# Patient Record
Sex: FEMALE | Race: WHITE | NOT HISPANIC OR LATINO | Employment: OTHER | URBAN - METROPOLITAN AREA
[De-identification: names, ages, dates, MRNs, and addresses within clinical notes are randomized per-mention and may not be internally consistent; named-entity substitution may affect disease eponyms.]

---

## 2017-01-04 ENCOUNTER — OFFICE VISIT (OUTPATIENT)
Dept: CARDIOLOGY | Facility: CLINIC | Age: 82
End: 2017-01-04

## 2017-01-04 VITALS
HEIGHT: 60 IN | SYSTOLIC BLOOD PRESSURE: 134 MMHG | WEIGHT: 122 LBS | HEART RATE: 80 BPM | BODY MASS INDEX: 23.95 KG/M2 | DIASTOLIC BLOOD PRESSURE: 70 MMHG

## 2017-01-04 DIAGNOSIS — I25.10 CHRONIC CORONARY ARTERY DISEASE: ICD-10-CM

## 2017-01-04 DIAGNOSIS — R06.02 SHORTNESS OF BREATH: ICD-10-CM

## 2017-01-04 DIAGNOSIS — R06.02 SOB (SHORTNESS OF BREATH): Primary | ICD-10-CM

## 2017-01-04 DIAGNOSIS — R00.1 BRADYCARDIA: Primary | ICD-10-CM

## 2017-01-04 DIAGNOSIS — R00.1 SINUS BRADYCARDIA: ICD-10-CM

## 2017-01-04 DIAGNOSIS — R00.2 PALPITATIONS: ICD-10-CM

## 2017-01-04 DIAGNOSIS — E78.5 HYPERLIPIDEMIA, UNSPECIFIED HYPERLIPIDEMIA TYPE: ICD-10-CM

## 2017-01-04 DIAGNOSIS — I10 ESSENTIAL HYPERTENSION: ICD-10-CM

## 2017-01-04 PROCEDURE — 99213 OFFICE O/P EST LOW 20 MIN: CPT | Performed by: INTERNAL MEDICINE

## 2017-01-04 RX ORDER — ACETAMINOPHEN 500 MG
500 TABLET ORAL EVERY 6 HOURS PRN
Status: ON HOLD | COMMUNITY
End: 2017-02-16 | Stop reason: ALTCHOICE

## 2017-01-04 NOTE — MR AVS SNAPSHOT
Trudy Castano   1/4/2017 9:00 AM   Office Visit    Dept Phone:  [phone]   Encounter #:  [id number]    Provider:  Phoebe Bowles MD   Department:  [facility]                Your Full Care Plan              Your Updated Medication List          This list is accurate as of: 1/4/17 10:08 AM.  Always use your most recent med list.                acetaminophen 500 MG tablet   Commonly known as:  TYLENOL       aspirin 81 MG tablet       furosemide 40 MG tablet   Commonly known as:  LASIX       montelukast 10 MG tablet   Commonly known as:  SINGULAIR   TAKE 1 TABLET DAILY       nebivolol 2.5 MG tablet   Commonly known as:  BYSTOLIC       potassium chloride 20 MEQ CR tablet   Commonly known as:  K-DUR,KLOR-CON       pravastatin 40 MG tablet   Commonly known as:  PRAVACHOL   TAKE 1 TABLET ONCE DAILY       traZODone 50 MG tablet   Commonly known as:  DESYREL       vitamin D3 5000 UNITS capsule capsule               You Were Diagnosed With        Codes Comments    Bradycardia    -  Primary ICD-10-CM: R00.1  ICD-9-CM: 427.89     Shortness of breath     ICD-10-CM: R06.02  ICD-9-CM: 786.05       Instructions     None    Patient Instructions History      Upcoming Appointments     Visit Type Date Time Department    OFFICE VISIT 1/4/2017  9:00 AM [facility] [place]     [facility] ECHO 2D COMPLETE VT 2/2/2017  9:00 AM [facility] [place] [facility]    FOLLOW UP 2/8/2017  9:15 AM [facility] [place]      MyChart Signup     Our records indicate that you have declined [facility]. If you would like to sign up for [place], please email [email or web address] or call [phone] to obtain an activation code.             Other Info from Your Visit           Your Appointments     Feb 02, 2017  9:00 AM EST   ECHOCARDIOGRAM 2D COMPLETE VISIT with [facility]   [facility] [place] [address] ([place] [address]  "[facility] [address] [phone]           Bring your insurance cards with you. Wear comfortable clothing and shoes.            Feb 08, 2017  9:15 AM EST   Follow Up with Phoebe Bowles MD   [facility] (--)    [address] [facility] [address] [phone]           Arrive 15 minutes prior to appointment.              Allergies     No Known Allergies      Vital Signs     Blood Pressure Pulse Height Weight Body Mass Index Smoking Status    134/70 80 60\" (152.4 cm) 122 lb (55.3 kg) 23.83 kg/m2 Former Smoker      Problems and Diagnoses Noted     Bradycardia    Shortness of breath        "

## 2017-01-04 NOTE — PROGRESS NOTES
Subjective:       Trudy Castano is a 84 y.o. female who here for follow up    CC  Follow-up for the hypertension coronary artery disease and pericardial  HPI  84-year-old white female with a known history of coronary artery disease as well as a benign essential hypertension, complaining of the shortness of breath and fatigue but no chest pains or tightness in chest no heaviness with a pressure sensation     Problem List Items Addressed This Visit        Cardiovascular and Mediastinum    Hypertension    Chronic coronary artery disease    Bradycardia - Primary       Respiratory    Shortness of breath       Other    Hyperlipidemia        Previous treatments/evaluations include: ASA and beta blocker. Cardiac risk factors: advanced age (older than 55 for men, 65 for women) and hypertension.    The following portions of the patient's history were reviewed and updated as appropriate: allergies, current medications, past family history, past medical history, past social history, past surgical history and problem list.    Past Medical History   Diagnosis Date   • Arrhythmia    • Benign essential hypertension    • Broken bones    • Cancer      skin cancer   • Chronic diastolic CHF (congestive heart failure)    • COPD (chronic obstructive pulmonary disease)    • Depression    • Fatigue    • Glaucoma    • Headache    • Hyperlipidemia    • Osteoporosis    • Palpitations    • Pneumonia    • Premature atrial complex    • Premature ventricular contractions    • Shortness of breath    • Sinus bradycardia    • Vasovagal syncope    • Weak pulse     reports that she quit smoking about 49 years ago. She has never used smokeless tobacco. She reports that she does not drink alcohol or use illicit drugs.  Family History   Problem Relation Age of Onset   • Heart disease Mother        Review of Systems  Constitutional: No wt loss, fever, fatigue  Gastrointestinal: No nausea, abdominal pain  Behavioral/Psych: No insomnia or anxiety    "Cardiovascular no chest pains or tightness in chest complaints of shortness of breath and fatigue  Objective:       Physical Exam           Physical Exam  Visit Vitals   • /70   • Pulse 80   • Ht 60\" (152.4 cm)   • Wt 122 lb (55.3 kg)   • BMI 23.83 kg/m2     General appearance: alert, appears stated age and cooperative, oriented x 3  Neck: no JVD and supple, symmetrical, trachea midline  Lungs: clear to auscultation bilaterally  Heart:Normal PMI,  S1, S2 normal, no murmur, rub or gallop  Extremities: normal range of motion, no cyanosis or edema,  Pulses: 2+ and symmetric  Skin: Skin color, texture, turgor normal. No rashes or lesions  Psych:  Pleasant and cooperative        Cardiographics  @Procedures    Echocardiogram:        Current Outpatient Prescriptions:   •  acetaminophen (TYLENOL) 500 MG tablet, Take 500 mg by mouth Every 6 (Six) Hours As Needed for mild pain (1-3)., Disp: , Rfl:   •  aspirin 81 MG tablet, Take 81 mg by mouth daily., Disp: , Rfl:   •  Cholecalciferol (VITAMIN D3) 5000 UNITS capsule capsule, Take by mouth., Disp: , Rfl:   •  furosemide (LASIX) 40 MG tablet, Take 40 mg by mouth., Disp: , Rfl:   •  montelukast (SINGULAIR) 10 MG tablet, TAKE 1 TABLET DAILY, Disp: 90 tablet, Rfl: 1  •  potassium chloride (K-DUR,KLOR-CON) 20 MEQ CR tablet, Take 20 mEq by mouth., Disp: , Rfl:   •  pravastatin (PRAVACHOL) 40 MG tablet, TAKE 1 TABLET ONCE DAILY, Disp: 90 tablet, Rfl: 1  •  traZODone (DESYREL) 50 MG tablet, Take 0.5 tablets by mouth nightly., Disp: , Rfl:   •  nebivolol (BYSTOLIC) 2.5 MG tablet, Take 2.5 mg by mouth daily., Disp: , Rfl:    Assessment:        Patient Active Problem List   Diagnosis   • Echocardiogram abnormal   • Abnormal electrocardiogram   • Depression   • Diabetes mellitus   • Fatigue   • Glaucoma   • Hyperlipidemia   • Hypertension   • Osteoporosis   • Palpitations   • Premature atrial contraction   • Ventricular premature beats   • Sinus bradycardia   • Shortness of breath "   • Vasovagal syncope   • Weak pulse   • Communicating hydrocephalus   • Intermittent asthma   • Oropharyngeal dysphagia   • Precordial pain   • Abnormal cardiovascular stress test   • Chronic coronary artery disease   • Supraventricular tachycardia     IMPRESSION OF HEART CATHETERIZATION:    1. Normal left main coronary artery.  2. Normal left anterior descending artery and its branches with the LAD   50% stenosis in mid portion  3. Normal left circumflex artery and its branches.  4. Normal right coronary artery dominant  5. Normal left ventricular systolic function.  LVEDP 10 mmHg.  Ejection   fraction 45%.mild mitral regurgitation    RECOMMENDATION:  Considering the coronary artery disease moderate, mild LV   dysfunction and mild mitral regurgitation is to be treated medically.      ·  The left ventricular cavity is borderline dilated.  · Left ventricular function is mildly decreased.  · Left ventricular diastolic dysfunction (grade I) consistent with impaired relaxation.  · Left ventricular wall segments contract abnormally. Refer to wall scoring diagram for more information.  · Mild mitral valve regurgitation is present  · Mild tricuspid valve regurgitation is present.  There is a trivial pericardial effusion   Plan:            ICD-10-CM ICD-9-CM   1. Bradycardia R00.1 427.89   2. Shortness of breath R06.02 786.05       Pt still sob    Will reduce the lasix to half and kcl to half    See in 1 month with echo when and lasix and kcl will be stopped    Restart bystolic    Risk of the hyperlipidemia, importance of the treatment has been explained    Pros and cons of the statins has been explained    Regular blood workup as well as side effects including the liver failure, myelopathy death has been explained          COUNSELING:    Trudy Trinh was given to patient for the following topics: diagnostic results, risk factor reductions, impressions, risks and benefits of treatment options and importance of  treatment compliance .       SMOKING COUNSELING:    Counseling given: Not Answered      EMR Dragon/Transcription disclaimer:   Much of this encounter note is an electronic transcription/translation of spoken language to printed text. The electronic translation of spoken language may permit erroneous, or at times, nonsensical words or phrases to be inadvertently transcribed; Although I have reviewed the note for such errors, some may still exist.

## 2017-02-02 ENCOUNTER — HOSPITAL ENCOUNTER (OUTPATIENT)
Dept: CARDIOLOGY | Facility: HOSPITAL | Age: 82
Discharge: HOME | End: 2017-02-02
Attending: INTERNAL MEDICINE | Admitting: INTERNAL MEDICINE

## 2017-02-02 VITALS
BODY MASS INDEX: 23.95 KG/M2 | WEIGHT: 122 LBS | DIASTOLIC BLOOD PRESSURE: 57 MMHG | SYSTOLIC BLOOD PRESSURE: 148 MMHG | HEART RATE: 40 BPM | HEIGHT: 60 IN

## 2017-02-02 DIAGNOSIS — R00.1 SINUS BRADYCARDIA: ICD-10-CM

## 2017-02-02 DIAGNOSIS — R00.2 PALPITATIONS: ICD-10-CM

## 2017-02-02 DIAGNOSIS — R06.02 SOB (SHORTNESS OF BREATH): ICD-10-CM

## 2017-02-02 PROCEDURE — 93306 TTE W/DOPPLER COMPLETE: CPT

## 2017-02-02 PROCEDURE — 93306 TTE W/DOPPLER COMPLETE: CPT | Performed by: INTERNAL MEDICINE

## 2017-02-03 LAB
BH CV ECHO MEAS - ACS: 1.4 CM
BH CV ECHO MEAS - AO MAX PG (FULL): 2.9 MMHG
BH CV ECHO MEAS - AO MAX PG: 6.5 MMHG
BH CV ECHO MEAS - AO MEAN PG (FULL): 2 MMHG
BH CV ECHO MEAS - AO MEAN PG: 4 MMHG
BH CV ECHO MEAS - AO ROOT AREA (BSA CORRECTED): 1.8
BH CV ECHO MEAS - AO ROOT AREA: 5.7 CM^2
BH CV ECHO MEAS - AO ROOT DIAM: 2.7 CM
BH CV ECHO MEAS - AO V2 MAX: 127 CM/SEC
BH CV ECHO MEAS - AO V2 MEAN: 91.7 CM/SEC
BH CV ECHO MEAS - AO V2 VTI: 30.7 CM
BH CV ECHO MEAS - AVA(I,A): 1.7 CM^2
BH CV ECHO MEAS - AVA(I,D): 1.7 CM^2
BH CV ECHO MEAS - AVA(V,A): 1.7 CM^2
BH CV ECHO MEAS - AVA(V,D): 1.7 CM^2
BH CV ECHO MEAS - BSA(HAYCOCK): 1.5 M^2
BH CV ECHO MEAS - BSA: 1.5 M^2
BH CV ECHO MEAS - BZI_BMI: 23.8 KILOGRAMS/M^2
BH CV ECHO MEAS - BZI_DIASTOLIC_PRESSURE: 57 MMHG
BH CV ECHO MEAS - BZI_HEART_RATE: 40 BPM
BH CV ECHO MEAS - BZI_METRIC_HEIGHT: 152.4 CM
BH CV ECHO MEAS - BZI_METRIC_WEIGHT: 55.3 KG
BH CV ECHO MEAS - BZI_SYSTOLIC_PRESSURE: 148 MMHG
BH CV ECHO MEAS - CONTRAST EF 4CH: 55.6 ML/M^2
BH CV ECHO MEAS - EDV(CUBED): 59.3 ML
BH CV ECHO MEAS - EDV(MOD-SP4): 36 ML
BH CV ECHO MEAS - EDV(TEICH): 65.9 ML
BH CV ECHO MEAS - EF(CUBED): 63.4 %
BH CV ECHO MEAS - EF(MOD-SP4): 55.6 %
BH CV ECHO MEAS - EF(TEICH): 55.6 %
BH CV ECHO MEAS - ESV(CUBED): 21.7 ML
BH CV ECHO MEAS - ESV(MOD-SP4): 16 ML
BH CV ECHO MEAS - ESV(TEICH): 29.3 ML
BH CV ECHO MEAS - FS: 28.5 %
BH CV ECHO MEAS - IVS/LVPW: 1
BH CV ECHO MEAS - IVSD: 1.1 CM
BH CV ECHO MEAS - LA DIMENSION: 3 CM
BH CV ECHO MEAS - LA/AO: 1.1
BH CV ECHO MEAS - LAT PEAK E' VEL: 7 CM/SEC
BH CV ECHO MEAS - LV DIASTOLIC VOL/BSA (35-75): 23.8 ML/M^2
BH CV ECHO MEAS - LV MASS(C)D: 140.1 GRAMS
BH CV ECHO MEAS - LV MASS(C)DI: 92.6 GRAMS/M^2
BH CV ECHO MEAS - LV MAX PG: 3.6 MMHG
BH CV ECHO MEAS - LV MEAN PG: 2 MMHG
BH CV ECHO MEAS - LV SYSTOLIC VOL/BSA (12-30): 10.6 ML/M^2
BH CV ECHO MEAS - LV V1 MAX: 94.7 CM/SEC
BH CV ECHO MEAS - LV V1 MEAN: 69.1 CM/SEC
BH CV ECHO MEAS - LV V1 VTI: 23 CM
BH CV ECHO MEAS - LVIDD: 3.9 CM
BH CV ECHO MEAS - LVIDS: 2.8 CM
BH CV ECHO MEAS - LVLD AP4: 6.5 CM
BH CV ECHO MEAS - LVLS AP4: 6.2 CM
BH CV ECHO MEAS - LVOT AREA (M): 2.3 CM^2
BH CV ECHO MEAS - LVOT AREA: 2.3 CM^2
BH CV ECHO MEAS - LVOT DIAM: 1.7 CM
BH CV ECHO MEAS - LVPWD: 1.1 CM
BH CV ECHO MEAS - MED PEAK E' VEL: 3 CM/SEC
BH CV ECHO MEAS - MR MAX PG: 28.5 MMHG
BH CV ECHO MEAS - MR MAX VEL: 267 CM/SEC
BH CV ECHO MEAS - MV A DUR: 0.12 SEC
BH CV ECHO MEAS - MV A MAX VEL: 91.8 CM/SEC
BH CV ECHO MEAS - MV DEC SLOPE: 312 CM/SEC^2
BH CV ECHO MEAS - MV DEC TIME: 0.2 SEC
BH CV ECHO MEAS - MV E MAX VEL: 57.8 CM/SEC
BH CV ECHO MEAS - MV E/A: 0.63
BH CV ECHO MEAS - MV MAX PG: 3.3 MMHG
BH CV ECHO MEAS - MV MEAN PG: 1 MMHG
BH CV ECHO MEAS - MV P1/2T MAX VEL: 61.2 CM/SEC
BH CV ECHO MEAS - MV P1/2T: 57.5 MSEC
BH CV ECHO MEAS - MV V2 MAX: 90.2 CM/SEC
BH CV ECHO MEAS - MV V2 MEAN: 27.5 CM/SEC
BH CV ECHO MEAS - MV V2 VTI: 19.2 CM
BH CV ECHO MEAS - MVA P1/2T LCG: 3.6 CM^2
BH CV ECHO MEAS - MVA(P1/2T): 3.8 CM^2
BH CV ECHO MEAS - MVA(VTI): 2.7 CM^2
BH CV ECHO MEAS - PA MAX PG: 2.7 MMHG
BH CV ECHO MEAS - PA MEAN PG: 1 MMHG
BH CV ECHO MEAS - PA V2 MAX: 78.2 CM/SEC
BH CV ECHO MEAS - PA V2 MEAN: 42.9 CM/SEC
BH CV ECHO MEAS - PA V2 VTI: 14.7 CM
BH CV ECHO MEAS - PULM A REVS DUR: 0.11 SEC
BH CV ECHO MEAS - PULM A REVS VEL: 51.3 CM/SEC
BH CV ECHO MEAS - PULM DIAS VEL: 42.9 CM/SEC
BH CV ECHO MEAS - PULM S/D: 1.3
BH CV ECHO MEAS - PULM SYS VEL: 57.3 CM/SEC
BH CV ECHO MEAS - RAP SYSTOLE: 10 MMHG
BH CV ECHO MEAS - RVOT AREA: 2 CM^2
BH CV ECHO MEAS - RVOT DIAM: 1.6 CM
BH CV ECHO MEAS - RVSP: 33.6 MMHG
BH CV ECHO MEAS - SI(AO): 116.2 ML/M^2
BH CV ECHO MEAS - SI(CUBED): 24.9 ML/M^2
BH CV ECHO MEAS - SI(LVOT): 34.5 ML/M^2
BH CV ECHO MEAS - SI(MOD-SP4): 13.2 ML/M^2
BH CV ECHO MEAS - SI(TEICH): 24.2 ML/M^2
BH CV ECHO MEAS - SV(AO): 175.8 ML
BH CV ECHO MEAS - SV(CUBED): 37.6 ML
BH CV ECHO MEAS - SV(LVOT): 52.2 ML
BH CV ECHO MEAS - SV(MOD-SP4): 20 ML
BH CV ECHO MEAS - SV(TEICH): 36.6 ML
BH CV ECHO MEAS - TAPSE (>1.6): 1.3 CM2
BH CV ECHO MEAS - TR MAX VEL: 243 CM/SEC
BH CV XLRA - RV BASE: 2.1 CM
BH CV XLRA - RV LENGTH: 6 CM
BH CV XLRA - RV MID: 1.8 CM
E/E' RATIO: 13
LEFT ATRIUM VOLUME INDEX: 17 ML/M2

## 2017-02-06 RX ORDER — NEBIVOLOL 2.5 MG/1
2.5 TABLET ORAL DAILY
Qty: 30 TABLET | Refills: 5 | Status: SHIPPED | OUTPATIENT
Start: 2017-02-06 | End: 2017-02-08 | Stop reason: SDUPTHER

## 2017-02-08 ENCOUNTER — OFFICE VISIT (OUTPATIENT)
Dept: CARDIOLOGY | Facility: CLINIC | Age: 82
End: 2017-02-08

## 2017-02-08 VITALS
HEART RATE: 40 BPM | SYSTOLIC BLOOD PRESSURE: 127 MMHG | WEIGHT: 121 LBS | BODY MASS INDEX: 23.75 KG/M2 | HEIGHT: 60 IN | DIASTOLIC BLOOD PRESSURE: 57 MMHG

## 2017-02-08 DIAGNOSIS — I47.1 SUPRAVENTRICULAR TACHYCARDIA (HCC): ICD-10-CM

## 2017-02-08 DIAGNOSIS — I25.10 CHRONIC CORONARY ARTERY DISEASE: Primary | ICD-10-CM

## 2017-02-08 DIAGNOSIS — I49.1 PREMATURE ATRIAL CONTRACTION: ICD-10-CM

## 2017-02-08 DIAGNOSIS — R09.89 WEAK PULSE: ICD-10-CM

## 2017-02-08 DIAGNOSIS — R00.2 PALPITATIONS: ICD-10-CM

## 2017-02-08 DIAGNOSIS — R55 VASOVAGAL SYNCOPE: ICD-10-CM

## 2017-02-08 DIAGNOSIS — E78.49 OTHER HYPERLIPIDEMIA: ICD-10-CM

## 2017-02-08 DIAGNOSIS — R00.1 BRADYCARDIA: ICD-10-CM

## 2017-02-08 DIAGNOSIS — R07.89 OTHER CHEST PAIN: ICD-10-CM

## 2017-02-08 DIAGNOSIS — E78.5 HYPERLIPIDEMIA, UNSPECIFIED HYPERLIPIDEMIA TYPE: ICD-10-CM

## 2017-02-08 DIAGNOSIS — Z01.810 PRE-OPERATIVE CARDIOVASCULAR EXAMINATION: Primary | ICD-10-CM

## 2017-02-08 DIAGNOSIS — R00.1 SINUS BRADYCARDIA: Primary | ICD-10-CM

## 2017-02-08 DIAGNOSIS — I10 ESSENTIAL HYPERTENSION: ICD-10-CM

## 2017-02-08 DIAGNOSIS — R00.1 SINUS BRADYCARDIA: ICD-10-CM

## 2017-02-08 PROCEDURE — 99213 OFFICE O/P EST LOW 20 MIN: CPT | Performed by: INTERNAL MEDICINE

## 2017-02-08 RX ORDER — NEBIVOLOL 2.5 MG/1
2.5 TABLET ORAL DAILY
Qty: 45 TABLET | Refills: 2 | Status: SHIPPED | OUTPATIENT
Start: 2017-02-08 | End: 2017-02-15

## 2017-02-08 RX ORDER — MELATONIN
1000 DAILY
COMMUNITY
End: 2020-12-21 | Stop reason: SDUPTHER

## 2017-02-08 NOTE — PROGRESS NOTES
Subjective:       Trudy Castano is a 84 y.o. female who here for follow up    CC  Increasing weakness and fatigue  HPI  84-year-old white female with known history of the chronic coronary artery disease, essential arterial hypertension and hyperlipidemia, has a significant episodes of bradycardia, patient has been feeling weak and tired and exhausted on minimum exertion which has been gradually worsening, pacemaker has been discussed for the several years, so far has been deferred because of the no clear indication, with a heart rate now is in the low 40s     Problem List Items Addressed This Visit        Cardiovascular and Mediastinum    Hyperlipidemia    Hypertension    Chronic coronary artery disease - Primary    Supraventricular tachycardia    Bradycardia        Previous treatments/evaluations include: ASA. Cardiac risk factors: advanced age (older than 55 for men, 65 for women).    The following portions of the patient's history were reviewed and updated as appropriate: allergies, current medications, past family history, past medical history, past social history, past surgical history and problem list.    Past Medical History   Diagnosis Date   • Arrhythmia    • Benign essential hypertension    • Broken bones    • Cancer      skin cancer   • Chronic diastolic CHF (congestive heart failure)    • COPD (chronic obstructive pulmonary disease)    • Depression    • Fatigue    • Glaucoma    • Headache    • Hyperlipidemia    • Osteoporosis    • Palpitations    • Pneumonia    • Premature atrial complex    • Premature ventricular contractions    • Shortness of breath    • Sinus bradycardia    • Vasovagal syncope    • Weak pulse     reports that she quit smoking about 49 years ago. She has never used smokeless tobacco. She reports that she does not drink alcohol or use illicit drugs.  Family History   Problem Relation Age of Onset   • Heart disease Mother        Review of Systems  Constitutional: No wt loss, fever,  "fatigue  Gastrointestinal: No nausea, abdominal pain  Behavioral/Psych: No insomnia or anxiety   Cardiovascular weakness and tiredness  Objective:       Physical Exam             Physical Exam  Visit Vitals   • /57 (BP Location: Left arm)   • Pulse (!) 40   • Ht 60\" (152.4 cm)   • Wt 121 lb (54.9 kg)   • Breastfeeding No   • BMI 23.63 kg/m2       General appearance: NAD, conversant   Eyes: anicteric sclerae, moist conjunctivae; no lid-lag; PERRLA   HENT: Atraumatic; oropharynx clear with moist mucous membranes and no mucosal ulcerations;  normal hard and soft palate   Neck: Trachea midline; FROM, supple, no thyromegaly or lymphadenopathy   Lungs: CTA, with normal respiratory effort and no intercostal retractions   CV: S1-S2 regular, sys murmurs, no rub, no gallop   Abdomen: Soft, non-tender; no masses or HSM   Extremities: No peripheral edema or extremity lymphadenopathy  Skin: Normal temperature, turgor and texture; no rash, ulcers or subcutaneous nodules   Psych: Appropriate affect, alert and oriented to person, place and time           Cardiographics  @Procedures    Echocardiogram:        Current Outpatient Prescriptions:   •  acetaminophen (TYLENOL) 500 MG tablet, Take 500 mg by mouth Every 6 (Six) Hours As Needed for mild pain (1-3)., Disp: , Rfl:   •  aspirin 81 MG tablet, Take 81 mg by mouth daily., Disp: , Rfl:   •  cholecalciferol (VITAMIN D3) 1000 UNITS tablet, Take 1,000 Units by mouth Daily., Disp: , Rfl:   •  furosemide (LASIX) 40 MG tablet, Take 40 mg by mouth Daily. TAKES 1/2 TABLET DAILY, Disp: , Rfl:   •  montelukast (SINGULAIR) 10 MG tablet, TAKE 1 TABLET DAILY, Disp: 90 tablet, Rfl: 1  •  nebivolol (BYSTOLIC) 2.5 MG tablet, Take 1 tablet by mouth Daily., Disp: 45 tablet, Rfl: 2  •  potassium chloride (K-DUR,KLOR-CON) 20 MEQ CR tablet, Take 20 mEq by mouth. TAKE 1/2 TABLET DAILY, Disp: , Rfl:   •  pravastatin (PRAVACHOL) 40 MG tablet, TAKE 1 TABLET ONCE DAILY, Disp: 90 tablet, Rfl: 1   " Assessment:        Patient Active Problem List   Diagnosis   • Echocardiogram abnormal   • Abnormal electrocardiogram   • Depression   • Diabetes mellitus   • Fatigue   • Glaucoma   • Hyperlipidemia   • Hypertension   • Osteoporosis   • Palpitations   • Premature atrial contraction   • Ventricular premature beats   • Sinus bradycardia   • Shortness of breath   • Vasovagal syncope   • Weak pulse   • Communicating hydrocephalus   • Intermittent asthma   • Oropharyngeal dysphagia   • Precordial pain   • Abnormal cardiovascular stress test   • Chronic coronary artery disease   • Supraventricular tachycardia   • Bradycardia     · All left ventricular wall segments contract normally.  · Left Ventricle: Calculated EF = 55.6%  · There is no evidence of pericardial effusion  · Trace-to-mild mitral valve regurgitation          Plan:            ICD-10-CM ICD-9-CM   1. Chronic coronary artery disease I25.10 414.00   2. Supraventricular tachycardia I47.1 427.89   3. Bradycardia R00.1 427.89   4. Other hyperlipidemia E78.4 272.4   5. Essential hypertension I10 401.9     84-year-old white female has a severe sick sinus syndrome highly symptomatic, gradually getting worse will require permanent pacemaker implantation    COUNSELING:  PT WILL BE TAKEN OFF THE LASIX AND KCL  NEEDS PACER    84-year-old white female having significant episodes of weakness as well as a fatigue which has been gradually getting worse, this has been going on for approximately 3 years, we have tried to avoid the pacemaker but things are worsening and patient has highly symptomatic sick sinus syndrome and will proceed with a pacemaker implantation, procedure risk and options have been explained    Trudy Trinh was given to patient for the following topics: diagnostic results, risk factor reductions, impressions, risks and benefits of treatment options and importance of treatment compliance .       SMOKING COUNSELING:    Counseling given: Not  Answered      EMR Dragon/Transcription disclaimer:   Much of this encounter note is an electronic transcription/translation of spoken language to printed text. The electronic translation of spoken language may permit erroneous, or at times, nonsensical words or phrases to be inadvertently transcribed; Although I have reviewed the note for such errors, some may still exist.

## 2017-02-13 ENCOUNTER — HOSPITAL ENCOUNTER (OUTPATIENT)
Dept: CARDIOLOGY | Facility: HOSPITAL | Age: 82
Discharge: HOME | End: 2017-02-13
Admitting: INTERNAL MEDICINE

## 2017-02-13 DIAGNOSIS — E78.5 HYPERLIPIDEMIA, UNSPECIFIED HYPERLIPIDEMIA TYPE: ICD-10-CM

## 2017-02-13 DIAGNOSIS — R07.89 OTHER CHEST PAIN: ICD-10-CM

## 2017-02-13 DIAGNOSIS — Z01.810 PRE-OPERATIVE CARDIOVASCULAR EXAMINATION: ICD-10-CM

## 2017-02-13 DIAGNOSIS — R00.1 SINUS BRADYCARDIA: ICD-10-CM

## 2017-02-13 LAB
ANION GAP SERPL CALCULATED.3IONS-SCNC: 15.6 MMOL/L
APTT PPP: 28.9 SECONDS (ref 22.7–35.4)
BASOPHILS # BLD AUTO: 0.06 10*3/MM3 (ref 0–0.2)
BASOPHILS NFR BLD AUTO: 1.3 % (ref 0–1.5)
BUN BLD-MCNC: 17 MG/DL (ref 8–23)
BUN/CREAT SERPL: 19.5 (ref 7–25)
CALCIUM SPEC-SCNC: 9.6 MG/DL (ref 8.6–10.5)
CHLORIDE SERPL-SCNC: 103 MMOL/L (ref 98–107)
CO2 SERPL-SCNC: 23.4 MMOL/L (ref 22–29)
CREAT BLD-MCNC: 0.87 MG/DL (ref 0.57–1)
DEPRECATED RDW RBC AUTO: 44.6 FL (ref 37–54)
EOSINOPHIL # BLD AUTO: 0.21 10*3/MM3 (ref 0–0.7)
EOSINOPHIL NFR BLD AUTO: 4.4 % (ref 0.3–6.2)
ERYTHROCYTE [DISTWIDTH] IN BLOOD BY AUTOMATED COUNT: 13 % (ref 11.7–13)
GFR SERPL CREATININE-BSD FRML MDRD: 62 ML/MIN/1.73
GLUCOSE BLD-MCNC: 149 MG/DL (ref 65–99)
HCT VFR BLD AUTO: 38.8 % (ref 35.6–45.5)
HGB BLD-MCNC: 12.8 G/DL (ref 11.9–15.5)
IMM GRANULOCYTES # BLD: 0 10*3/MM3 (ref 0–0.03)
IMM GRANULOCYTES NFR BLD: 0 % (ref 0–0.5)
INR PPP: 1.02 (ref 0.9–1.1)
LYMPHOCYTES # BLD AUTO: 1.16 10*3/MM3 (ref 0.9–4.8)
LYMPHOCYTES NFR BLD AUTO: 24.4 % (ref 19.6–45.3)
MCH RBC QN AUTO: 30.8 PG (ref 26.9–32)
MCHC RBC AUTO-ENTMCNC: 33 G/DL (ref 32.4–36.3)
MCV RBC AUTO: 93.3 FL (ref 80.5–98.2)
MONOCYTES # BLD AUTO: 0.38 10*3/MM3 (ref 0.2–1.2)
MONOCYTES NFR BLD AUTO: 8 % (ref 5–12)
NEUTROPHILS # BLD AUTO: 2.94 10*3/MM3 (ref 1.9–8.1)
NEUTROPHILS NFR BLD AUTO: 61.9 % (ref 42.7–76)
PLATELET # BLD AUTO: 243 10*3/MM3 (ref 140–500)
PMV BLD AUTO: 10.7 FL (ref 6–12)
POTASSIUM BLD-SCNC: 4.6 MMOL/L (ref 3.5–5.2)
PROTHROMBIN TIME: 13 SECONDS (ref 11.7–14.2)
RBC # BLD AUTO: 4.16 10*6/MM3 (ref 3.9–5.2)
SODIUM BLD-SCNC: 142 MMOL/L (ref 136–145)
WBC NRBC COR # BLD: 4.75 10*3/MM3 (ref 4.5–10.7)

## 2017-02-13 PROCEDURE — 85610 PROTHROMBIN TIME: CPT | Performed by: INTERNAL MEDICINE

## 2017-02-13 PROCEDURE — 85025 COMPLETE CBC W/AUTO DIFF WBC: CPT | Performed by: INTERNAL MEDICINE

## 2017-02-13 PROCEDURE — 85730 THROMBOPLASTIN TIME PARTIAL: CPT | Performed by: INTERNAL MEDICINE

## 2017-02-13 PROCEDURE — 36415 COLL VENOUS BLD VENIPUNCTURE: CPT | Performed by: INTERNAL MEDICINE

## 2017-02-13 PROCEDURE — 80048 BASIC METABOLIC PNL TOTAL CA: CPT | Performed by: INTERNAL MEDICINE

## 2017-02-15 RX ORDER — MONTELUKAST SODIUM 10 MG/1
10 TABLET ORAL NIGHTLY
COMMUNITY
End: 2020-12-21

## 2017-02-15 RX ORDER — NEBIVOLOL 2.5 MG/1
2.5 TABLET ORAL DAILY
COMMUNITY
End: 2017-07-25 | Stop reason: SDUPTHER

## 2017-02-15 RX ORDER — PRAVASTATIN SODIUM 40 MG
40 TABLET ORAL DAILY
COMMUNITY
End: 2019-03-13

## 2017-02-16 ENCOUNTER — HOSPITAL ENCOUNTER (OUTPATIENT)
Facility: HOSPITAL | Age: 82
Discharge: HOME | End: 2017-02-17
Attending: INTERNAL MEDICINE | Admitting: INTERNAL MEDICINE

## 2017-02-16 DIAGNOSIS — R55 VASOVAGAL SYNCOPE: ICD-10-CM

## 2017-02-16 DIAGNOSIS — I49.1 PREMATURE ATRIAL CONTRACTION: ICD-10-CM

## 2017-02-16 DIAGNOSIS — R00.1 BRADYCARDIA: ICD-10-CM

## 2017-02-16 DIAGNOSIS — R00.1 SINUS BRADYCARDIA: ICD-10-CM

## 2017-02-16 DIAGNOSIS — R00.2 PALPITATIONS: ICD-10-CM

## 2017-02-16 DIAGNOSIS — R09.89 WEAK PULSE: ICD-10-CM

## 2017-02-16 LAB
ANION GAP SERPL CALCULATED.3IONS-SCNC: 12.5 MMOL/L
BUN BLD-MCNC: 14 MG/DL (ref 8–23)
BUN/CREAT SERPL: 18.9 (ref 7–25)
CALCIUM SPEC-SCNC: 9.3 MG/DL (ref 8.6–10.5)
CHLORIDE SERPL-SCNC: 101 MMOL/L (ref 98–107)
CO2 SERPL-SCNC: 25.5 MMOL/L (ref 22–29)
CREAT BLD-MCNC: 0.74 MG/DL (ref 0.57–1)
DEPRECATED RDW RBC AUTO: 44.2 FL (ref 37–54)
ERYTHROCYTE [DISTWIDTH] IN BLOOD BY AUTOMATED COUNT: 12.8 % (ref 11.7–13)
GFR SERPL CREATININE-BSD FRML MDRD: 75 ML/MIN/1.73
GLUCOSE BLD-MCNC: 159 MG/DL (ref 65–99)
HCT VFR BLD AUTO: 38.5 % (ref 35.6–45.5)
HGB BLD-MCNC: 12.7 G/DL (ref 11.9–15.5)
MCH RBC QN AUTO: 31.3 PG (ref 26.9–32)
MCHC RBC AUTO-ENTMCNC: 33 G/DL (ref 32.4–36.3)
MCV RBC AUTO: 94.8 FL (ref 80.5–98.2)
PLATELET # BLD AUTO: 222 10*3/MM3 (ref 140–500)
PMV BLD AUTO: 10.4 FL (ref 6–12)
POTASSIUM BLD-SCNC: 3.8 MMOL/L (ref 3.5–5.2)
RBC # BLD AUTO: 4.06 10*6/MM3 (ref 3.9–5.2)
SODIUM BLD-SCNC: 139 MMOL/L (ref 136–145)
WBC NRBC COR # BLD: 10.97 10*3/MM3 (ref 4.5–10.7)

## 2017-02-16 PROCEDURE — C1898 LEAD, PMKR, OTHER THAN TRANS: HCPCS | Performed by: INTERNAL MEDICINE

## 2017-02-16 PROCEDURE — 85027 COMPLETE CBC AUTOMATED: CPT | Performed by: INTERNAL MEDICINE

## 2017-02-16 PROCEDURE — 0 IOPAMIDOL PER 1 ML: Performed by: INTERNAL MEDICINE

## 2017-02-16 PROCEDURE — 25010000002 VANCOMYCIN PER 500 MG: Performed by: INTERNAL MEDICINE

## 2017-02-16 PROCEDURE — 99153 MOD SED SAME PHYS/QHP EA: CPT | Performed by: INTERNAL MEDICINE

## 2017-02-16 PROCEDURE — G0378 HOSPITAL OBSERVATION PER HR: HCPCS

## 2017-02-16 PROCEDURE — 33208 INSRT HEART PM ATRIAL & VENT: CPT | Performed by: INTERNAL MEDICINE

## 2017-02-16 PROCEDURE — 25010000002 MIDAZOLAM PER 1 MG: Performed by: INTERNAL MEDICINE

## 2017-02-16 PROCEDURE — 99152 MOD SED SAME PHYS/QHP 5/>YRS: CPT | Performed by: INTERNAL MEDICINE

## 2017-02-16 PROCEDURE — C1785 PMKR, DUAL, RATE-RESP: HCPCS | Performed by: INTERNAL MEDICINE

## 2017-02-16 PROCEDURE — C1894 INTRO/SHEATH, NON-LASER: HCPCS | Performed by: INTERNAL MEDICINE

## 2017-02-16 PROCEDURE — 25010000003 CEFAZOLIN PER 500 MG: Performed by: INTERNAL MEDICINE

## 2017-02-16 PROCEDURE — 25010000002 ONDANSETRON PER 1 MG: Performed by: NURSE PRACTITIONER

## 2017-02-16 PROCEDURE — 80048 BASIC METABOLIC PNL TOTAL CA: CPT | Performed by: INTERNAL MEDICINE

## 2017-02-16 PROCEDURE — 25010000002 FENTANYL CITRATE (PF) 100 MCG/2ML SOLUTION: Performed by: INTERNAL MEDICINE

## 2017-02-16 DEVICE — PACE/SENSE LEAD
Type: IMPLANTABLE DEVICE | Status: FUNCTIONAL
Brand: INGEVITY™ MRI

## 2017-02-16 DEVICE — PACEMAKER
Type: IMPLANTABLE DEVICE | Status: FUNCTIONAL
Brand: ACCOLADE™ MRI DR

## 2017-02-16 DEVICE — ENDOCARDIAL STEROID-ELUTING MR CONDITIONAL STYLET DELIVERED PACE/SENSE LEAD
Type: IMPLANTABLE DEVICE | Status: FUNCTIONAL
Brand: INGEVITY™ MRI

## 2017-02-16 RX ORDER — LIDOCAINE HYDROCHLORIDE 10 MG/ML
0.1 INJECTION, SOLUTION EPIDURAL; INFILTRATION; INTRACAUDAL; PERINEURAL ONCE AS NEEDED
Status: DISCONTINUED | OUTPATIENT
Start: 2017-02-16 | End: 2017-02-17 | Stop reason: HOSPADM

## 2017-02-16 RX ORDER — HYDROCODONE BITARTRATE AND ACETAMINOPHEN 5; 325 MG/1; MG/1
1 TABLET ORAL EVERY 4 HOURS PRN
Status: DISCONTINUED | OUTPATIENT
Start: 2017-02-16 | End: 2017-02-17 | Stop reason: HOSPADM

## 2017-02-16 RX ORDER — ASPIRIN 325 MG
162 TABLET ORAL DAILY
Status: DISCONTINUED | OUTPATIENT
Start: 2017-02-16 | End: 2017-02-17 | Stop reason: HOSPADM

## 2017-02-16 RX ORDER — NEBIVOLOL 5 MG/1
2.5 TABLET ORAL DAILY
Status: DISCONTINUED | OUTPATIENT
Start: 2017-02-16 | End: 2017-02-17 | Stop reason: HOSPADM

## 2017-02-16 RX ORDER — SODIUM CHLORIDE 0.9 % (FLUSH) 0.9 %
1-10 SYRINGE (ML) INJECTION AS NEEDED
Status: DISCONTINUED | OUTPATIENT
Start: 2017-02-16 | End: 2017-02-17 | Stop reason: HOSPADM

## 2017-02-16 RX ORDER — FENTANYL CITRATE 50 UG/ML
INJECTION, SOLUTION INTRAMUSCULAR; INTRAVENOUS AS NEEDED
Status: DISCONTINUED | OUTPATIENT
Start: 2017-02-16 | End: 2017-02-16 | Stop reason: HOSPADM

## 2017-02-16 RX ORDER — MONTELUKAST SODIUM 10 MG/1
10 TABLET ORAL NIGHTLY
Status: DISCONTINUED | OUTPATIENT
Start: 2017-02-16 | End: 2017-02-17 | Stop reason: HOSPADM

## 2017-02-16 RX ORDER — PRAVASTATIN SODIUM 40 MG
40 TABLET ORAL DAILY
Status: DISCONTINUED | OUTPATIENT
Start: 2017-02-16 | End: 2017-02-17 | Stop reason: HOSPADM

## 2017-02-16 RX ORDER — ONDANSETRON 2 MG/ML
4 INJECTION INTRAMUSCULAR; INTRAVENOUS ONCE
Status: COMPLETED | OUTPATIENT
Start: 2017-02-16 | End: 2017-02-16

## 2017-02-16 RX ORDER — FUROSEMIDE 40 MG/1
40 TABLET ORAL DAILY
Status: DISCONTINUED | OUTPATIENT
Start: 2017-02-16 | End: 2017-02-17 | Stop reason: HOSPADM

## 2017-02-16 RX ORDER — VANCOMYCIN HYDROCHLORIDE 1 G/200ML
INJECTION, SOLUTION INTRAVENOUS CONTINUOUS PRN
Status: DISCONTINUED | OUTPATIENT
Start: 2017-02-16 | End: 2017-02-16 | Stop reason: HOSPADM

## 2017-02-16 RX ORDER — ACETAMINOPHEN 325 MG/1
650 TABLET ORAL EVERY 4 HOURS PRN
Status: DISCONTINUED | OUTPATIENT
Start: 2017-02-16 | End: 2017-02-17 | Stop reason: HOSPADM

## 2017-02-16 RX ORDER — MIDAZOLAM HYDROCHLORIDE 1 MG/ML
INJECTION INTRAMUSCULAR; INTRAVENOUS AS NEEDED
Status: DISCONTINUED | OUTPATIENT
Start: 2017-02-16 | End: 2017-02-16 | Stop reason: HOSPADM

## 2017-02-16 RX ORDER — MELATONIN
1000 DAILY
Status: DISCONTINUED | OUTPATIENT
Start: 2017-02-16 | End: 2017-02-17 | Stop reason: HOSPADM

## 2017-02-16 RX ORDER — POTASSIUM CHLORIDE 750 MG/1
10 CAPSULE, EXTENDED RELEASE ORAL DAILY
Status: DISCONTINUED | OUTPATIENT
Start: 2017-02-16 | End: 2017-02-17 | Stop reason: HOSPADM

## 2017-02-16 RX ORDER — NITROGLYCERIN 0.4 MG/1
0.4 TABLET SUBLINGUAL
Status: DISCONTINUED | OUTPATIENT
Start: 2017-02-16 | End: 2017-02-17 | Stop reason: HOSPADM

## 2017-02-16 RX ORDER — LIDOCAINE HYDROCHLORIDE 10 MG/ML
INJECTION, SOLUTION INFILTRATION; PERINEURAL AS NEEDED
Status: DISCONTINUED | OUTPATIENT
Start: 2017-02-16 | End: 2017-02-16 | Stop reason: HOSPADM

## 2017-02-16 RX ORDER — SODIUM CHLORIDE 9 MG/ML
125 INJECTION, SOLUTION INTRAVENOUS CONTINUOUS
Status: DISCONTINUED | OUTPATIENT
Start: 2017-02-16 | End: 2017-02-17 | Stop reason: HOSPADM

## 2017-02-16 RX ADMIN — CEFAZOLIN SODIUM 1 G: 1 INJECTION, SOLUTION INTRAVENOUS at 07:43

## 2017-02-16 RX ADMIN — VANCOMYCIN HYDROCHLORIDE 750 MG: 750 INJECTION, POWDER, LYOPHILIZED, FOR SOLUTION INTRAVENOUS at 20:42

## 2017-02-16 RX ADMIN — HYDROCODONE BITARTRATE AND ACETAMINOPHEN 1 TABLET: 5; 325 TABLET ORAL at 22:20

## 2017-02-16 RX ADMIN — ONDANSETRON 4 MG: 2 INJECTION INTRAMUSCULAR; INTRAVENOUS at 13:53

## 2017-02-16 RX ADMIN — HYDROCODONE BITARTRATE AND ACETAMINOPHEN 1 TABLET: 5; 325 TABLET ORAL at 12:11

## 2017-02-16 NOTE — PLAN OF CARE
Problem: Patient Care Overview (Adult)  Goal: Plan of Care Review  Outcome: Ongoing (interventions implemented as appropriate)    02/16/17 1140   Coping/Psychosocial Response Interventions   Plan Of Care Reviewed With patient;velasquez   Patient Care Overview   Progress no change   Outcome Evaluation   Outcome Summary/Follow up Plan ready for transfer       Goal: Adult Individualization and Mutuality  Outcome: Ongoing (interventions implemented as appropriate)  Goal: Discharge Needs Assessment  Outcome: Ongoing (interventions implemented as appropriate)    Problem: Perioperative Period (Adult)  Goal: Signs and Symptoms of Listed Potential Problems Will be Absent or Manageable (Perioperative Period)  Outcome: Ongoing (interventions implemented as appropriate)

## 2017-02-17 ENCOUNTER — APPOINTMENT (OUTPATIENT)
Dept: GENERAL RADIOLOGY | Facility: HOSPITAL | Age: 82
End: 2017-02-17
Attending: INTERNAL MEDICINE

## 2017-02-17 VITALS
BODY MASS INDEX: 23.56 KG/M2 | HEIGHT: 60 IN | HEART RATE: 96 BPM | WEIGHT: 120 LBS | SYSTOLIC BLOOD PRESSURE: 134 MMHG | DIASTOLIC BLOOD PRESSURE: 56 MMHG | TEMPERATURE: 97.7 F | OXYGEN SATURATION: 96 % | RESPIRATION RATE: 16 BRPM

## 2017-02-17 LAB
INR PPP: 1.07 (ref 0.9–1.1)
PROTHROMBIN TIME: 13.5 SECONDS (ref 11.7–14.2)

## 2017-02-17 PROCEDURE — 71020 HC CHEST PA AND LATERAL: CPT

## 2017-02-17 PROCEDURE — 93005 ELECTROCARDIOGRAM TRACING: CPT | Performed by: INTERNAL MEDICINE

## 2017-02-17 PROCEDURE — 93010 ELECTROCARDIOGRAM REPORT: CPT | Performed by: INTERNAL MEDICINE

## 2017-02-17 PROCEDURE — G0378 HOSPITAL OBSERVATION PER HR: HCPCS

## 2017-02-17 PROCEDURE — 85610 PROTHROMBIN TIME: CPT | Performed by: INTERNAL MEDICINE

## 2017-02-17 PROCEDURE — 99217 PR OBSERVATION CARE DISCHARGE MANAGEMENT: CPT | Performed by: INTERNAL MEDICINE

## 2017-02-17 NOTE — DISCHARGE SUMMARY
[facility]  Physician Discharge Summary    Patient Identification:  Name: Trudy Castano  Age: 84 y.o.  Sex: female  :  10/12/1932  MRN: [id number]    Admit date: 2017    Discharge date and time:  17      Admitting Physician: Phoebe Bowles MD     Discharge Physician: DR Bowles    Discharge Diagnoses:   - sss/ symptomatic bradycardia  - s/p a-v Ace maker implant  - CAD  - HTN  - Dyslipidemia      Discharged Condition: Stable    Hospital Course:   This patient was admitted for elective permanent pacemaker implant due to symptomatic bradycardia with weakness, fatigability with minimal exertion and bradycardia and documented heart rates into the 40s.  She received IV antibiotic prophylaxis before implant of [employer] dual-chamber permanent pacemaker utilizing the left subclavian.  She tolerated the procedure without complications.    On postprocedure day #1, device interrogation showed normal function.  Chest x-ray (see below) shows no acute findings.  EKG (see below) show sinus rhythm with PACs.  Intermittent atrial pacing noted on telemetry.    Patient is awake alert and ambulating without difficulty.  She has minimal incisional discomfort.  I reviewed activity restrictions, site care, follow-up appointments with the patient.  An appointment card was provided.  She was instructed to call our office with any questions or concerns otherwise we will see her as scheduled on .  All questions were answered.  She voiced understanding and agreement with treatment plan      Consults:   None    Discharge Exam:  General: No acute distress   Skin: Warm and dry, no diaphoresis noted   EYES: PERTL   HEENT: external ear and nose normal; oral mucosa moist   Neck: no JVD   Heart: S1S2 fairly regular rate and rhythm without occasional premature beat; no murmur or rub appreciated left subclavian procedure site with well approximated edges, dry intact Steri-Strips.  No oozing,  ecchymosis, hematoma    Pulmonary: Respirations regular, unlabored at rest, bilateral breath sounds have good air entry throughout all lung fields; no crackles or wheezes auscultated.     GI: Soft, non-tender, positive bowel sounds     Extremities: Bilateral lower extremities have no pre-tibial pitting edema   Neurological: Alert and oriented x 3; no neuro deficits      Telemetry: Sinus with PACs, occasional AV pacing        Postop chest x-ray 2-17-17 FINDINGS:  1. Interval placement of pacemaker 5 left subclavian approach, no pneumothorax.  2. Mild chronic pulmonary change, previous ACDF with instrumentation lower cervical spine, vascular calcification borderline cardiac enlargement question emphysema without evidence of acute pulmonary parenchymal process.    Postop EKG 2-17-17:      LABS:    Results from last 7 days  Lab Units 02/17/17  0454 02/13/17  1502   INR  1.07 1.02   APTT seconds  --  28.9         Disposition:  home    Discharge Medications:    Trudy Castano   Home Medication Instructions ALLIE:[id number]    Printed on:02/17/17 8814   Medication Information                      aspirin 81 MG tablet  Take 81 mg by mouth daily.             cholecalciferol (VITAMIN D3) 1000 UNITS tablet  Take 1,000 Units by mouth Daily.             furosemide (LASIX) 40 MG tablet  Take 40 mg by mouth Daily. TAKES 1/2 TABLET DAILY             montelukast (SINGULAIR) 10 MG tablet  Take 10 mg by mouth Every Night.             nebivolol (BYSTOLIC) 2.5 MG tablet  Take 2.5 mg by mouth Daily.             potassium chloride (K-DUR,KLOR-CON) 20 MEQ CR tablet  Take 10 mEq by mouth Daily. TAKE 1/2 TABLET DAILY             pravastatin (PRAVACHOL) 40 MG tablet  Take 40 mg by mouth Daily.                   Discharge Home Instructions:  1. Routine post pacemaker implant discharge home care instructions:  Do not get site wet for 72 hours.  Do not submerge device site below water for 7-10 days.  No lifting left upper extremity above  head.  No lifting objects greater than 1 pound with affected extremity.   No driving until cleared by cardiologist at follow up appointment.   Wear sling until cleared by cardiologist.  Call office at [phone] or [phone] for any fevers, chills, redness,bleeding, drainage, increased pain, etc.     2. Follow up in the pacemaker clinic at the [address] office as scheduled on March 9 at 3:15  3.  Follow-up with PCP 7-10 days        I reviewed the patient's new clinical results, discharge treatments, medications and follow up  I personally viewed and interpreted the patient's EKG/Telemetry data  Signed:  Phoebe Bowles MD  2/17/2017  10:35 AM      EMR Dragon/Transcription disclaimer:   Much of this encounter note is an electronic transcription/translation of spoken language to printed text. The electronic translation of spoken language may permit erroneous, or at times, nonsensical words or phrases to be inadvertently transcribed; Although I have reviewed the note for such errors, some may still exist.

## 2017-02-17 NOTE — PROGRESS NOTES
Discharge Planning Assessment  [facility]     Patient Name: Trudy Castano  MRN: [id number]  Today's Date: 2/17/2017    Admit Date: 2/16/2017          Discharge Needs Assessment       02/17/17 1033    Living Environment    Lives With alone    Living Arrangements house    Quality Of Family Relationships supportive    Able to Return to Prior Living Arrangements yes    Discharge Needs Assessment    Concerns To Be Addressed no discharge needs identified    Readmission Within The Last 30 Days no previous admission in last 30 days    Equipment Currently Used at Home none    Transportation Available car;family or friend will provide            Discharge Plan       02/17/17 1037    Case Management/Social Work Plan    Additional Comments Spoke with patient at bedside, face sheet verified. Patient lives alone in a 2 story house with a basement her laundry room is in the basement, she stated she has hand rails to her basement. Patient does not use any DME. Patient denies any discharge needs stating she has 5 children that help her. José CHEN      02/17/17 1033    Case Management/Social Work Plan    Plan home        Discharge Placement     No information found        Expected Discharge Date and Time     Expected Discharge Date Expected Discharge Time    Feb 17, 2017               Demographic Summary       02/17/17 1030    Referral Information    Admission Type observation    Contact Information    Permission Granted to Share Information With family/designee   Remigio Castano son [phone]    Primary Care Physician Information    Name Ramón Begum MD    Phone [phone]            Functional Status       02/17/17 1032    Functional Status Current    Ambulation 0-->independent    Transferring 0-->independent    Toileting 2-->assistive person    Bathing 2-->assistive person    Dressing 2-->assistive person    Eating 0-->independent    Communication 0-->understands/communicates without difficulty    Swallowing (if  score 2 or more for any item, consult Rehab Services) 0-->swallows foods/liquids without difficulty    Functional Status Prior    Ambulation 0-->independent    Transferring 0-->independent    Toileting 0-->independent    Bathing 0-->independent    Dressing 0-->independent    Eating 0-->independent    Communication 0-->understands/communicates without difficulty    Swallowing 0-->swallows foods/liquids without difficulty    IADL    Medications independent    Meal Preparation independent    Housekeeping independent    Laundry independent    Shopping independent    Oral Care independent                   Shannon Brunson, RN

## 2017-03-06 RX ORDER — MONTELUKAST SODIUM 10 MG/1
TABLET ORAL
Qty: 90 TABLET | Refills: 1 | OUTPATIENT
Start: 2017-03-06

## 2017-03-09 ENCOUNTER — OFFICE VISIT (OUTPATIENT)
Dept: CARDIOLOGY | Facility: CLINIC | Age: 82
End: 2017-03-09

## 2017-03-09 ENCOUNTER — CLINICAL SUPPORT NO REQUIREMENTS (OUTPATIENT)
Dept: CARDIOLOGY | Facility: CLINIC | Age: 82
End: 2017-03-09

## 2017-03-09 VITALS
HEART RATE: 60 BPM | DIASTOLIC BLOOD PRESSURE: 58 MMHG | WEIGHT: 122 LBS | SYSTOLIC BLOOD PRESSURE: 118 MMHG | BODY MASS INDEX: 23.95 KG/M2 | HEIGHT: 60 IN

## 2017-03-09 DIAGNOSIS — I25.10 CHRONIC CORONARY ARTERY DISEASE: Primary | ICD-10-CM

## 2017-03-09 DIAGNOSIS — I25.10 CORONARY ARTERY DISEASE INVOLVING NATIVE CORONARY ARTERY OF NATIVE HEART WITHOUT ANGINA PECTORIS: ICD-10-CM

## 2017-03-09 DIAGNOSIS — Z95.0 PACEMAKER: ICD-10-CM

## 2017-03-09 DIAGNOSIS — Z95.0 PACEMAKER: Primary | ICD-10-CM

## 2017-03-09 DIAGNOSIS — I10 ESSENTIAL HYPERTENSION: ICD-10-CM

## 2017-03-09 DIAGNOSIS — R53.83 OTHER FATIGUE: ICD-10-CM

## 2017-03-09 PROCEDURE — 93288 INTERROG EVL PM/LDLS PM IP: CPT | Performed by: INTERNAL MEDICINE

## 2017-03-09 PROCEDURE — 99213 OFFICE O/P EST LOW 20 MIN: CPT | Performed by: INTERNAL MEDICINE

## 2017-03-09 NOTE — PROGRESS NOTES
Subjective:       Trudy Castano is a 84 y.o. female who here for follow up    CC  Follow up for HTN, FATIGUE AND PACEMAKER IMPLANTATION  HPI  84 YR OLD WITH KN CAD, SSS, HAS MARKEDLY IMPROVED SINCE PACEMAKER PLACEMENT DONE LAST MONTH     Problem List Items Addressed This Visit        Cardiovascular and Mediastinum    Chronic coronary artery disease - Primary      Other Visit Diagnoses     Coronary artery disease involving native coronary artery of native heart without angina pectoris            Previous treatments/evaluations include: ASA and beta blocker. Cardiac risk factors: advanced age (older than 55 for men, 65 for women) and hypertension.    The following portions of the patient's history were reviewed and updated as appropriate: allergies, current medications, past family history, past medical history, past social history, past surgical history and problem list.    Past Medical History   Diagnosis Date   • Arrhythmia    • Benign essential hypertension    • Broken bones    • Cancer      skin cancer   • Chronic diastolic CHF (congestive heart failure)    • COPD (chronic obstructive pulmonary disease)    • Depression    • Fatigue    • Glaucoma    • Headache    • Hyperlipidemia    • Osteoporosis    • Palpitations    • Pneumonia    • Premature atrial complex    • Premature ventricular contractions    • Shortness of breath    • Sinus bradycardia    • Vasovagal syncope    • Weak pulse     reports that she quit smoking about 50 years ago. She has never used smokeless tobacco. She reports that she does not drink alcohol or use illicit drugs.  Family History   Problem Relation Age of Onset   • Heart disease Mother        Review of Systems  Constitutional: No wt loss, fever, fatigue  Gastrointestinal: No nausea, abdominal pain  Behavioral/Psych: No insomnia or anxiety   Cardiovascular NO CP  Objective:       Physical Exam             Physical Exam  Visit Vitals   • /58 (BP Location: Right arm, Patient  "Position: Sitting)   • Pulse 60   • Ht 60\" (152.4 cm)   • Wt 122 lb (55.3 kg)   • BMI 23.83 kg/m2       General appearance: NAD, conversant   Eyes: anicteric sclerae, moist conjunctivae; no lid-lag; PERRLA   HENT: Atraumatic; oropharynx clear with moist mucous membranes and no mucosal ulcerations;  normal hard and soft palate   Neck: Trachea midline; FROM, supple, no thyromegaly or lymphadenopathy   Lungs: CTA, with normal respiratory effort and no intercostal retractions   CV: S1-S2 regular, no murmurs, no rub, no gallop   Abdomen: Soft, non-tender; no masses or HSM   Extremities: No peripheral edema or extremity lymphadenopathy  Skin: Normal temperature, turgor and texture; no rash, ulcers or subcutaneous nodules   Psych: Appropriate affect, alert and oriented to person, place and time     PACEMAKER HEALED WELL      Cardiographics  @Procedures    Echocardiogram:        Current Outpatient Prescriptions:   •  aspirin 81 MG tablet, Take 81 mg by mouth daily., Disp: , Rfl:   •  cholecalciferol (VITAMIN D3) 1000 UNITS tablet, Take 1,000 Units by mouth Daily., Disp: , Rfl:   •  furosemide (LASIX) 40 MG tablet, Take 40 mg by mouth Daily. TAKES 1/2 TABLET DAILY, Disp: , Rfl:   •  montelukast (SINGULAIR) 10 MG tablet, Take 10 mg by mouth Every Night., Disp: , Rfl:   •  nebivolol (BYSTOLIC) 2.5 MG tablet, Take 2.5 mg by mouth Daily., Disp: , Rfl:   •  potassium chloride (K-DUR,KLOR-CON) 20 MEQ CR tablet, Take 10 mEq by mouth Daily. TAKE 1/2 TABLET DAILY, Disp: , Rfl:   •  pravastatin (PRAVACHOL) 40 MG tablet, Take 40 mg by mouth Daily., Disp: , Rfl:    Assessment:        Patient Active Problem List   Diagnosis   • Echocardiogram abnormal   • Abnormal electrocardiogram   • Depression   • Diabetes mellitus   • Fatigue   • Glaucoma   • Hyperlipidemia   • Hypertension   • Osteoporosis   • Palpitations   • Premature atrial contraction   • Ventricular premature beats   • Sinus bradycardia   • Shortness of breath   • Vasovagal " syncope   • Weak pulse   • Communicating hydrocephalus   • Intermittent asthma   • Oropharyngeal dysphagia   • Precordial pain   • Abnormal cardiovascular stress test   • Chronic coronary artery disease   • Supraventricular tachycardia   • Bradycardia               Plan:            ICD-10-CM ICD-9-CM   1. Chronic coronary artery disease I25.10 414.00   2. Coronary artery disease involving native coronary artery of native heart without angina pectoris I25.10 414.01   3. Essential hypertension I10 401.9   4. Other fatigue R53.83 780.79   5. Pacemaker Z95.0 V45.01       Post pacer healing well    htn much better    See 6 months    COUNSELING:    Trudy Trinh was given to patient for the following topics: diagnostic results, risk factor reductions, impressions, risks and benefits of treatment options and importance of treatment compliance .       SMOKING COUNSELING:    Counseling given: Not Answered      EMR Dragon/Transcription disclaimer:   Much of this encounter note is an electronic transcription/translation of spoken language to printed text. The electronic translation of spoken language may permit erroneous, or at times, nonsensical words or phrases to be inadvertently transcribed; Although I have reviewed the note for such errors, some may still exist.

## 2017-03-13 PROBLEM — I25.10 CORONARY ARTERY DISEASE INVOLVING NATIVE CORONARY ARTERY OF NATIVE HEART WITHOUT ANGINA PECTORIS: Status: ACTIVE | Noted: 2017-03-13

## 2017-03-13 PROBLEM — Z95.0 PACEMAKER: Status: ACTIVE | Noted: 2017-03-13

## 2017-06-15 ENCOUNTER — CLINICAL SUPPORT NO REQUIREMENTS (OUTPATIENT)
Dept: CARDIOLOGY | Facility: CLINIC | Age: 82
End: 2017-06-15

## 2017-06-15 DIAGNOSIS — Z45.018 ADJUSTMENT AND MANAGEMENT OF CARDIAC PACEMAKER: Primary | ICD-10-CM

## 2017-06-15 PROCEDURE — 93280 PM DEVICE PROGR EVAL DUAL: CPT | Performed by: INTERNAL MEDICINE

## 2017-07-25 RX ORDER — NEBIVOLOL 2.5 MG/1
2.5 TABLET ORAL DAILY
Qty: 90 TABLET | Refills: 0 | Status: SHIPPED | OUTPATIENT
Start: 2017-07-25 | End: 2017-10-30 | Stop reason: SDUPTHER

## 2017-09-28 ENCOUNTER — CLINICAL SUPPORT NO REQUIREMENTS (OUTPATIENT)
Dept: CARDIOLOGY | Facility: CLINIC | Age: 82
End: 2017-09-28

## 2017-09-28 ENCOUNTER — OFFICE VISIT (OUTPATIENT)
Dept: CARDIOLOGY | Facility: CLINIC | Age: 82
End: 2017-09-28

## 2017-09-28 VITALS
DIASTOLIC BLOOD PRESSURE: 61 MMHG | SYSTOLIC BLOOD PRESSURE: 125 MMHG | WEIGHT: 121 LBS | BODY MASS INDEX: 23.75 KG/M2 | HEIGHT: 60 IN | HEART RATE: 77 BPM

## 2017-09-28 DIAGNOSIS — I25.10 CORONARY ARTERY DISEASE INVOLVING NATIVE CORONARY ARTERY OF NATIVE HEART WITHOUT ANGINA PECTORIS: ICD-10-CM

## 2017-09-28 DIAGNOSIS — Z95.0 CARDIAC PACEMAKER: Primary | ICD-10-CM

## 2017-09-28 DIAGNOSIS — I47.1 SUPRAVENTRICULAR TACHYCARDIA (HCC): ICD-10-CM

## 2017-09-28 DIAGNOSIS — I25.10 CHRONIC CORONARY ARTERY DISEASE: ICD-10-CM

## 2017-09-28 DIAGNOSIS — R06.02 SOB (SHORTNESS OF BREATH): Primary | ICD-10-CM

## 2017-09-28 DIAGNOSIS — R00.1 BRADYCARDIA: ICD-10-CM

## 2017-09-28 DIAGNOSIS — Z95.0 PACEMAKER: ICD-10-CM

## 2017-09-28 PROCEDURE — 93288 INTERROG EVL PM/LDLS PM IP: CPT | Performed by: INTERNAL MEDICINE

## 2017-09-28 PROCEDURE — 99213 OFFICE O/P EST LOW 20 MIN: CPT | Performed by: INTERNAL MEDICINE

## 2017-09-28 NOTE — PROGRESS NOTES
Subjective:       Trudy Castano is a 84 y.o. female who here for follow up    CC  Follow-up for the coronary artery disease with recurrent ventricular tachycardia  HPI  84-year-old white female with known history of coronary artery disease as well as supraventricular tachycardia and the pacemaker here for the follow-up with no complaints of chest pains or tightness in chest with heaviness with a pressure sensation     Problem List Items Addressed This Visit        Cardiovascular and Mediastinum    Chronic coronary artery disease    Supraventricular tachycardia    Bradycardia    Coronary artery disease involving native coronary artery of native heart without angina pectoris    Relevant Orders    Adult Transthoracic Echo Complete W/ Cont if Necessary Per Protocol    Pacemaker      Other Visit Diagnoses     SOB (shortness of breath)    -  Primary    Relevant Orders    Adult Transthoracic Echo Complete W/ Cont if Necessary Per Protocol        .    The following portions of the patient's history were reviewed and updated as appropriate: allergies, current medications, past family history, past medical history, past social history, past surgical history and problem list.    Past Medical History:   Diagnosis Date   • Arrhythmia    • Benign essential hypertension    • Broken bones    • Cancer     skin cancer   • Chronic diastolic CHF (congestive heart failure)    • COPD (chronic obstructive pulmonary disease)    • Depression    • Fatigue    • Glaucoma    • Headache    • Hyperlipidemia    • Osteoporosis    • Palpitations    • Pneumonia    • Premature atrial complex    • Premature ventricular contractions    • Shortness of breath    • Sinus bradycardia    • Vasovagal syncope    • Weak pulse     reports that she quit smoking about 50 years ago. She has never used smokeless tobacco. She reports that she does not drink alcohol or use illicit drugs.  Family History   Problem Relation Age of Onset   • Heart disease Mother   "      Review of Systems  Constitutional: No wt loss, fever, fatigue  Gastrointestinal: No nausea, abdominal pain  Behavioral/Psych: No insomnia or anxiety   Cardiovascular No chest pains or tightness in chest  Objective:       Physical Exam             Physical Exam  /61  Pulse 77  Ht 60\" (152.4 cm)  Wt 121 lb (54.9 kg)  BMI 23.63 kg/m2    General appearance: NAD, conversant   Eyes: anicteric sclerae, moist conjunctivae; no lid-lag; PERRLA   HENT: Atraumatic; oropharynx clear with moist mucous membranes and no mucosal ulcerations;  normal hard and soft palate   Neck: Trachea midline; FROM, supple, no thyromegaly or lymphadenopathy   Lungs: CTA, with normal respiratory effort and no intercostal retractions   CV: S1-S2 regular, sys murmurs, no rub, no gallop   Abdomen: Soft, non-tender; no masses or HSM   Extremities: No peripheral edema or extremity lymphadenopathy  Skin: Normal temperature, turgor and texture; no rash, ulcers or subcutaneous nodules   Psych: Appropriate affect, alert and oriented to person, place and time           Cardiographics  @Procedures    Echocardiogram:        Current Outpatient Prescriptions:   •  aspirin 81 MG tablet, Take 81 mg by mouth daily., Disp: , Rfl:   •  cholecalciferol (VITAMIN D3) 1000 UNITS tablet, Take 1,000 Units by mouth Daily., Disp: , Rfl:   •  montelukast (SINGULAIR) 10 MG tablet, Take 10 mg by mouth Every Night., Disp: , Rfl:   •  nebivolol (BYSTOLIC) 2.5 MG tablet, Take 1 tablet by mouth Daily., Disp: 90 tablet, Rfl: 0  •  pravastatin (PRAVACHOL) 40 MG tablet, Take 40 mg by mouth Daily., Disp: , Rfl:    Assessment:        Patient Active Problem List   Diagnosis   • Echocardiogram abnormal   • Abnormal electrocardiogram   • Depression   • Diabetes mellitus   • Fatigue   • Glaucoma   • Hyperlipidemia   • Hypertension   • Osteoporosis   • Palpitations   • Premature atrial contraction   • Ventricular premature beats   • Sinus bradycardia   • Shortness of breath "   • Vasovagal syncope   • Weak pulse   • Communicating hydrocephalus   • Intermittent asthma   • Oropharyngeal dysphagia   • Precordial pain   • Abnormal cardiovascular stress test   • Chronic coronary artery disease   • Supraventricular tachycardia   • Bradycardia   • Coronary artery disease involving native coronary artery of native heart without angina pectoris   • Pacemaker               Plan:            ICD-10-CM ICD-9-CM   1. SOB (shortness of breath) R06.02 786.05   2. Coronary artery disease involving native coronary artery of native heart without angina pectoris I25.10 414.01   3. Chronic coronary artery disease I25.10 414.00   4. Supraventricular tachycardia I47.1 427.89   5. Bradycardia R00.1 427.89   6. Pacemaker Z95.0 V45.01     1. SOB (shortness of breath)  Multifactorial  - Adult Transthoracic Echo Complete W/ Cont if Necessary Per Protocol; Future    2. Coronary artery disease involving native coronary artery of native heart without angina pectoris  Trudysreekanth Castano with coronary artery disease has no angina pectoris    Risk reduction for the coronary artery disease, controlling the blood pressure, blood sugar management, cholesterol management, exercise, stress management, and proper compliance with medications and follow-up has been discussed    - Adult Transthoracic Echo Complete W/ Cont if Necessary Per Protocol; Future    3. Chronic coronary artery disease      4. Supraventricular tachycardia  Under control    5. Bradycardia  Result with a pacemaker    6. Pacemaker  Functioning normally     See 1 yr with echo  COUNSELING:    Trudy Trinh was given to patient for the following topics: diagnostic results, risk factor reductions, impressions, risks and benefits of treatment options and importance of treatment compliance .       SMOKING COUNSELING:    Counseling given: Not Answered      EMR Dragon/Transcription disclaimer:   Much of this encounter note is an electronic  transcription/translation of spoken language to printed text. The electronic translation of spoken language may permit erroneous, or at times, nonsensical words or phrases to be inadvertently transcribed; Although I have reviewed the note for such errors, some may still exist.

## 2017-10-30 ENCOUNTER — TELEPHONE (OUTPATIENT)
Dept: CARDIOLOGY | Facility: CLINIC | Age: 82
End: 2017-10-30

## 2017-10-30 RX ORDER — NEBIVOLOL 2.5 MG/1
2.5 TABLET ORAL DAILY
Qty: 90 TABLET | Refills: 0 | Status: SHIPPED | OUTPATIENT
Start: 2017-10-30 | End: 2018-04-25 | Stop reason: SDUPTHER

## 2017-10-30 NOTE — TELEPHONE ENCOUNTER
----- Message from Bianca Desir sent at 10/30/2017 11:12 AM EDT -----  Regarding: refill  Contact: [phone]  bystolic 2.5 mg 90 day to [facility] til mail order gets to her please          Sent to [facility]

## 2018-01-02 ENCOUNTER — CLINICAL SUPPORT NO REQUIREMENTS (OUTPATIENT)
Dept: CARDIOLOGY | Facility: CLINIC | Age: 83
End: 2018-01-02

## 2018-01-02 DIAGNOSIS — Z95.0 PACEMAKER: Primary | ICD-10-CM

## 2018-01-02 PROCEDURE — 93294 REM INTERROG EVL PM/LDLS PM: CPT | Performed by: INTERNAL MEDICINE

## 2018-01-02 PROCEDURE — 93296 REM INTERROG EVL PM/IDS: CPT | Performed by: INTERNAL MEDICINE

## 2018-01-06 ENCOUNTER — APPOINTMENT (OUTPATIENT)
Dept: CT IMAGING | Facility: HOSPITAL | Age: 83
End: 2018-01-06

## 2018-01-06 ENCOUNTER — APPOINTMENT (OUTPATIENT)
Dept: GENERAL RADIOLOGY | Facility: HOSPITAL | Age: 83
End: 2018-01-06

## 2018-01-06 ENCOUNTER — HOSPITAL ENCOUNTER (EMERGENCY)
Facility: HOSPITAL | Age: 83
Discharge: HOME | End: 2018-01-06
Attending: EMERGENCY MEDICINE | Admitting: EMERGENCY MEDICINE

## 2018-01-06 VITALS
HEIGHT: 60 IN | RESPIRATION RATE: 16 BRPM | HEART RATE: 71 BPM | BODY MASS INDEX: 23.56 KG/M2 | SYSTOLIC BLOOD PRESSURE: 122 MMHG | TEMPERATURE: 98 F | WEIGHT: 120 LBS | OXYGEN SATURATION: 96 % | DIASTOLIC BLOOD PRESSURE: 98 MMHG

## 2018-01-06 DIAGNOSIS — M16.0 ARTHRITIS OF BOTH HIPS: Primary | ICD-10-CM

## 2018-01-06 DIAGNOSIS — S76.011A STRAIN OF RIGHT HIP, INITIAL ENCOUNTER: ICD-10-CM

## 2018-01-06 LAB
ALBUMIN SERPL-MCNC: 4.2 G/DL (ref 3.5–5.2)
ALBUMIN/GLOB SERPL: 1.6 G/DL
ALP SERPL-CCNC: 85 U/L (ref 39–117)
ALT SERPL W P-5'-P-CCNC: 11 U/L (ref 1–33)
ANION GAP SERPL CALCULATED.3IONS-SCNC: 11.9 MMOL/L
AST SERPL-CCNC: 15 U/L (ref 1–32)
BASOPHILS # BLD AUTO: 0.03 10*3/MM3 (ref 0–0.2)
BASOPHILS NFR BLD AUTO: 0.4 % (ref 0–1.5)
BILIRUB SERPL-MCNC: 0.4 MG/DL (ref 0.1–1.2)
BUN BLD-MCNC: 19 MG/DL (ref 8–23)
BUN/CREAT SERPL: 22.6 (ref 7–25)
CALCIUM SPEC-SCNC: 9.2 MG/DL (ref 8.6–10.5)
CHLORIDE SERPL-SCNC: 100 MMOL/L (ref 98–107)
CO2 SERPL-SCNC: 26.1 MMOL/L (ref 22–29)
CREAT BLD-MCNC: 0.84 MG/DL (ref 0.57–1)
DEPRECATED RDW RBC AUTO: 48.2 FL (ref 37–54)
EOSINOPHIL # BLD AUTO: 0.05 10*3/MM3 (ref 0–0.7)
EOSINOPHIL NFR BLD AUTO: 0.7 % (ref 0.3–6.2)
ERYTHROCYTE [DISTWIDTH] IN BLOOD BY AUTOMATED COUNT: 13.6 % (ref 11.7–13)
GFR SERPL CREATININE-BSD FRML MDRD: 64 ML/MIN/1.73
GLOBULIN UR ELPH-MCNC: 2.7 GM/DL
GLUCOSE BLD-MCNC: 95 MG/DL (ref 65–99)
HCT VFR BLD AUTO: 41.2 % (ref 35.6–45.5)
HGB BLD-MCNC: 13.4 G/DL (ref 11.9–15.5)
IMM GRANULOCYTES # BLD: 0.03 10*3/MM3 (ref 0–0.03)
IMM GRANULOCYTES NFR BLD: 0.4 % (ref 0–0.5)
LYMPHOCYTES # BLD AUTO: 1.77 10*3/MM3 (ref 0.9–4.8)
LYMPHOCYTES NFR BLD AUTO: 26.2 % (ref 19.6–45.3)
MCH RBC QN AUTO: 31.8 PG (ref 26.9–32)
MCHC RBC AUTO-ENTMCNC: 32.5 G/DL (ref 32.4–36.3)
MCV RBC AUTO: 97.9 FL (ref 80.5–98.2)
MONOCYTES # BLD AUTO: 0.54 10*3/MM3 (ref 0.2–1.2)
MONOCYTES NFR BLD AUTO: 8 % (ref 5–12)
NEUTROPHILS # BLD AUTO: 4.34 10*3/MM3 (ref 1.9–8.1)
NEUTROPHILS NFR BLD AUTO: 64.3 % (ref 42.7–76)
NRBC BLD MANUAL-RTO: 0 /100 WBC (ref 0–0)
PLATELET # BLD AUTO: 211 10*3/MM3 (ref 140–500)
PMV BLD AUTO: 10.2 FL (ref 6–12)
POTASSIUM BLD-SCNC: 4.3 MMOL/L (ref 3.5–5.2)
PROT SERPL-MCNC: 6.9 G/DL (ref 6–8.5)
RBC # BLD AUTO: 4.21 10*6/MM3 (ref 3.9–5.2)
SODIUM BLD-SCNC: 138 MMOL/L (ref 136–145)
WBC NRBC COR # BLD: 6.76 10*3/MM3 (ref 4.5–10.7)

## 2018-01-06 PROCEDURE — 70450 CT HEAD/BRAIN W/O DYE: CPT

## 2018-01-06 PROCEDURE — 80053 COMPREHEN METABOLIC PANEL: CPT | Performed by: EMERGENCY MEDICINE

## 2018-01-06 PROCEDURE — 73502 X-RAY EXAM HIP UNI 2-3 VIEWS: CPT

## 2018-01-06 PROCEDURE — 85025 COMPLETE CBC W/AUTO DIFF WBC: CPT | Performed by: EMERGENCY MEDICINE

## 2018-01-06 PROCEDURE — 99284 EMERGENCY DEPT VISIT MOD MDM: CPT

## 2018-01-06 PROCEDURE — 93010 ELECTROCARDIOGRAM REPORT: CPT | Performed by: INTERNAL MEDICINE

## 2018-01-06 PROCEDURE — 93005 ELECTROCARDIOGRAM TRACING: CPT | Performed by: EMERGENCY MEDICINE

## 2018-01-06 RX ORDER — SODIUM CHLORIDE 0.9 % (FLUSH) 0.9 %
10 SYRINGE (ML) INJECTION AS NEEDED
Status: DISCONTINUED | OUTPATIENT
Start: 2018-01-06 | End: 2018-01-06 | Stop reason: HOSPADM

## 2018-01-06 NOTE — ED TRIAGE NOTES
Patient family states that she has been walking funny, patient states that her right hip has been hurting her. She states she doesn't put a lot of weight on her leg.

## 2018-01-06 NOTE — ED NOTES
Pt reports falling before this pain occurred and landing on R side but denies R hip hurting at that time. Neurovascular intact. Reassurance given; call light in reach. Pts breathing even and unlabored. Pt appears in NAD at this time. Family at bedside.        Rufina Ellis RN  01/06/18 [id number]

## 2018-01-06 NOTE — ED TRIAGE NOTES
RT HIP PAIN INTERMITTENT X 4 WEEKS, DENIES INJURY. PT HAS INCREASED TODAY MAKING IT HARD TO WALK. DENIES NUMBNESS/TINGLING.

## 2018-01-06 NOTE — ED PROVIDER NOTES
" EMERGENCY DEPARTMENT ENCOUNTER    CHIEF COMPLAINT  Chief Complaint: R hip pain  History given by: Pt and family  History limited by: Nothing  Room Number: 48/48  PMD: Ramón Begum MD      HPI:  Pt is a 85 y.o. female who presents complaining of R hip pain that onset 4 weeks ago that has consistently worsened until today when the pain became more acute. Per the pt's daughter, the pt has been walking off balance to a noticeable extent. Per the pt's daughter, the pt has said that her feet have been \"getting in the way of turning around.\" The pt denies back pain, difficulty urinating or difficult BM. Per the pt's daughter, the pt had fluid drained from her brain 2 years ago.    Duration:  4 weeks  Onset: Gradual  Timing: Constant  Location: R hip  Radiation: None  Quality: \"pain\"  Intensity/Severity: Moderate  Progression: Worsening  Associated Symptoms: None  Aggravating Factors: Unspecified  Alleviating Factors: Unspecified  Previous Episodes: Unspecified  Treatment before arrival: None    PAST MEDICAL HISTORY  Active Ambulatory Problems     Diagnosis Date Noted   • Echocardiogram abnormal 02/24/2016   • Abnormal electrocardiogram 02/24/2016   • Depression 02/24/2016   • Diabetes mellitus 02/24/2016   • Fatigue 02/24/2016   • Glaucoma 02/24/2016   • Hyperlipidemia 02/24/2016   • Hypertension 02/24/2016   • Osteoporosis 02/24/2016   • Palpitations 02/24/2016   • Premature atrial contraction 02/24/2016   • Ventricular premature beats 02/24/2016   • Sinus bradycardia 02/24/2016   • Shortness of breath 02/24/2016   • Vasovagal syncope 02/24/2016   • Weak pulse 02/24/2016   • 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   • Intermittent asthma 03/04/2016   • Oropharyngeal dysphagia 03/04/2016   • Precordial pain 03/26/2016   • Abnormal cardiovascular stress test 12/20/2012   • Chronic coronary artery disease 01/24/2013   • Supraventricular tachycardia 11/19/2012   • Bradycardia 01/04/2017   • Coronary artery disease " involving native coronary artery of native heart without angina pectoris 03/13/2017   • Pacemaker 03/13/2017     Resolved Ambulatory Problems     Diagnosis Date Noted   • No Resolved Ambulatory Problems     Past Medical History:   Diagnosis Date   • Arrhythmia    • Benign essential hypertension    • Broken bones    • Cancer    • Chronic diastolic CHF (congestive heart failure)    • COPD (chronic obstructive pulmonary disease)    • Depression    • Fatigue    • Glaucoma    • Headache    • Hyperlipidemia    • Osteoporosis    • Palpitations    • Pneumonia    • Premature atrial complex    • Premature ventricular contractions    • Shortness of breath    • Sinus bradycardia    • Vasovagal syncope    • Weak pulse        PAST SURGICAL HISTORY  Past Surgical History:   Procedure Laterality Date   • BILATERAL BREAST REDUCTION     • CARDIAC CATHETERIZATION      OUTCOME: SUCCESSFUL   • CARDIAC CATHETERIZATION N/A 3/25/2016    Procedure: Left Heart Cath;  Surgeon: Phoebe Bowles MD;  Location: [facility] CATH [facility]VASIVE LOC[facility];  Service:    • CARDIAC CATHETERIZATION N/A 3/25/2016    Procedure: Left ventriculography;  Surgeon: Phoebe Bowles MD;  Location:  [facility] CATH INVASIVE LOCATION;  Service:    • CARDIAC CATHETERIZATION N/A 3/25/2016    Procedure: Coronary angiography;  Surgeon: Phoebe Bowles MD;  Location:  [facility] CATH INVASIVE LOCATION;  Service:    • CARDIAC ELECTROPHYSIOLOGY PROCEDURE N/A 2/16/2017    Procedure: Pacemaker DC new  [place];  Surgeon: Phoebe Bowles MD;  Location:  [facility] CATH INVASIVE LOCATION;  Service:    • CERVICAL FUSION     • ECTOPIC PREGNANCY     • ELBOW DEBRIDEMENT     • EYE SURGERY     • HEMORRHOIDECTOMY     • HYSTERECTOMY     • INSERT / REPLACE / REMOVE PACEMAKER     • LUMBAR PUNCTURE      large volume lumbar puncture 35ml   • NECK SURGERY     • OOPHORECTOMY      FOR ECTOPIC PREGNANCY   • SPINAL FUSION  2012    C3-C4 Fusion Dr. Millard       FAMILY HISTORY  Family History    Problem Relation Age of Onset   • Heart disease Mother        SOCIAL HISTORY  Social History     Social History   • Marital status:      Spouse name: N/A   • Number of children: N/A   • Years of education: N/A     Occupational History   • Not on file.     Social History Main Topics   • Smoking status: Former Smoker     Quit date: 3/1/1967   • Smokeless tobacco: Never Used   • Alcohol use No      Comment: SOCIAL   • Drug use: No   • Sexual activity: Defer     Other Topics Concern   • Not on file     Social History Narrative       ALLERGIES  Review of patient's allergies indicates no known allergies.    REVIEW OF SYSTEMS  Review of Systems   Constitutional: Negative for fever.   HENT: Negative for sore throat.    Eyes: Negative.    Respiratory: Negative for cough and shortness of breath.    Cardiovascular: Negative for chest pain.   Gastrointestinal: Negative for abdominal pain, diarrhea and vomiting.   Genitourinary: Negative for dysuria.   Musculoskeletal: Positive for arthralgias (R hip pain). Negative for neck pain.   Skin: Negative for rash.   Allergic/Immunologic: Negative.    Neurological: Negative for weakness, numbness and headaches.   Hematological: Negative.    Psychiatric/Behavioral: Negative.    All other systems reviewed and are negative.      PHYSICAL EXAM  ED Triage Vitals   Temp Heart Rate Resp BP SpO2   01/06/18 1718 01/06/18 1719 01/06/18 1718 01/06/18 1728 01/06/18 1718   98 °F (36.7 °C) 45 18 173/71 98 %      Temp src Heart Rate Source Patient Position BP Location FiO2 (%)   -- -- -- -- --              Physical Exam   Constitutional: She is oriented to person, place, and time and well-developed, well-nourished, and in no distress. No distress.   HENT:   Head: Normocephalic and atraumatic.   Eyes: EOM are normal. Pupils are equal, round, and reactive to light.   Neck: Normal range of motion. Neck supple.   Cardiovascular: Normal rate, regular rhythm and normal heart sounds.     Pulmonary/Chest: Effort normal and breath sounds normal. No respiratory distress.   Abdominal: Soft. There is no tenderness. There is no rebound and no guarding.   Musculoskeletal: Normal range of motion. She exhibits no edema.   MIld tenderness to lateral aspect of R hip worse with flexion, not worse with abduction or rotation   Neurological: She is alert and oriented to person, place, and time. She has normal sensation and normal strength.   Reflex Scores:       Patellar reflexes are 2+ on the right side and 2+ on the left side.  Skin: Skin is warm and dry. No rash noted.   Psychiatric: Mood and affect normal.   Nursing note and vitals reviewed.      LAB RESULTS  Lab Results (last 24 hours)     Procedure Component Value Units Date/Time    CBC & Differential [[id number]] Collected:  01/06/18 1856    Specimen:  Blood Updated:  01/06/18 1913    Narrative:       The following orders were created for panel order CBC & Differential.  Procedure                               Abnormality         Status                     ---------                               -----------         ------                     CBC Auto Differential[[id number]]        Abnormal            Final result                 Please view results for these tests on the individual orders.    CBC Auto Differential [[id number]]  (Abnormal) Collected:  01/06/18 1856    Specimen:  Blood Updated:  01/06/18 1913     WBC 6.76 10*3/mm3      RBC 4.21 10*6/mm3      Hemoglobin 13.4 g/dL      Hematocrit 41.2 %      MCV 97.9 fL      MCH 31.8 pg      MCHC 32.5 g/dL      RDW 13.6 (H) %      RDW-SD 48.2 fl      MPV 10.2 fL      Platelets 211 10*3/mm3      Neutrophil % 64.3 %      Lymphocyte % 26.2 %      Monocyte % 8.0 %      Eosinophil % 0.7 %      Basophil % 0.4 %      Immature Grans % 0.4 %      Neutrophils, Absolute 4.34 10*3/mm3      Lymphocytes, Absolute 1.77 10*3/mm3      Monocytes, Absolute 0.54 10*3/mm3      Eosinophils, Absolute 0.05 10*3/mm3      Basophils,  Absolute 0.03 10*3/mm3      Immature Grans, Absolute 0.03 10*3/mm3      nRBC 0.0 /100 WBC     Comprehensive Metabolic Panel [[id number]] Collected:  01/06/18 2003    Specimen:  Blood Updated:  01/06/18 2041     Glucose 95 mg/dL      BUN 19 mg/dL      Creatinine 0.84 mg/dL      Sodium 138 mmol/L      Potassium 4.3 mmol/L      Chloride 100 mmol/L      CO2 26.1 mmol/L      Calcium 9.2 mg/dL      Total Protein 6.9 g/dL      Albumin 4.20 g/dL      ALT (SGPT) 11 U/L      AST (SGOT) 15 U/L      Alkaline Phosphatase 85 U/L      Total Bilirubin 0.4 mg/dL      eGFR Non African Amer 64 mL/min/1.73      Globulin 2.7 gm/dL      A/G Ratio 1.6 g/dL      BUN/Creatinine Ratio 22.6     Anion Gap 11.9 mmol/L     Narrative:       The MDRD GFR formula is only valid for adults with stable renal function between ages 18 and 70.          I ordered the above labs and reviewed the results    RADIOLOGY  CT Head Without Contrast   Final Result   1. No acute intracranial abnormality.                           This study was performed with techniques to keep radiation doses as low   as reasonably achievable (ALARA). Individualized dose reduction   techniques using automated exposure control or adjustment of mA and/or   kV according to the patient size were employed.        This report was finalized on 1/6/2018 7:49 PM by Fam Cagle MD.          XR Hip With or Without Pelvis 2 - 3 View Right   FINDINGS:  An AP view of the pelvis as well as AP and frog leg lateral  views of the right hip demonstrate moderate loss of disc height at L3-L4  with vacuum disc effect. Mild to moderate degenerative disease involving  the hips are noted bilaterally. There is no evidence of fracture or  dislocation. Further evaluation could be performed with a limited MRI  examination of the hip as indicated.           I ordered the above noted radiological studies. Interpreted by radiologist. Reviewed by me in PACS.       PROCEDURES  Procedures  EKG           EKG time:  1847  Rhythm/Rate: Atrial paced complexes  P waves and MI: Normal  QRS, axis: Old anterior MI  ST and T waves: Normal     Interpreted Contemporaneously by me, independently viewed  Changed compared to prior 2/17/2017      PROGRESS AND CONSULTS  ED Course   1804 Ordered XR R hip for further evaluation.    1806 Ordered ambulation of the pt to evaluate gait.    1830 The pt has a mild stiff gait.     2009 Given that the CT head does not show hydrocephalus, and there are degenerative changes in hip, I suspect the hip pain and intermittent difficulty walking is functional and not neurologic.     2036 Rechecked the patient and she is resting comfortably. Discussed pertinent lab and imaging results, including CT head which was negative, as well as XR hip which shows that the pt has arthritis in both hips. Discussed the plan to discharge the pt pending the new lab results. Patient agrees with plan and all questions were addressed.       MEDICAL DECISION MAKING  Results were reviewed/discussed with the patient and they were also made aware of online access. Pt also made aware that some labs, such as cultures, will not be resulted during ER visit and follow up with PMD is necessary.     MDM  Number of Diagnoses or Management Options     Amount and/or Complexity of Data Reviewed  Clinical lab tests: ordered and reviewed (Unremarkable labs)  Tests in the radiology section of CPT®: ordered and reviewed (CT head: no acute abnormality  XR R hip: moderate loss of disc height at L3-L4, mild to moderate degenerative disease, no fracture)  Tests in the medicine section of CPT®: ordered and reviewed (See EKG)  Decide to obtain previous medical records or to obtain history from someone other than the patient: yes  Review and summarize past medical records: yes (2/5/2016 The pt was diagnosed with normal pressure hydrocephalus.)    Patient Progress  Patient progress: stable         DIAGNOSIS  Final diagnoses:   Arthritis of both hips    Strain of right hip, initial encounter       DISPOSITION  Disposition: Discharged.    Patient discharged in stable condition.    Reviewed implications of results, diagnosis, meds, responsibility to follow up, warning signs and symptoms of possible worsening, potential complications and reasons to return to ER, including new or worsening symptoms.    Patient/Family voiced understanding of above instructions.    Discussed plan for discharge, as there is no emergent indication for admission.  Pt/family is agreeable and understands need for follow up and repeat testing.  Pt is aware that discharge does not mean that nothing is wrong but it indicates no emergency is present and they must continue care with follow-up as given below or physician of their choice.     FOLLOW-UP  Ramón Begum MD  [address]  [facility] [address]  [phone]    Schedule an appointment as soon as possible for a visit      Coy Cooper MD  [address] [facility] [address]  [facility] [address]  [phone]    Schedule an appointment as soon as possible for a visit           Medication List      Notice     No changes were made to your prescriptions during this visit.          Latest Documented Vital Signs:  As of 11:34 PM  BP- 122/98 HR- 71 Temp- 98 °F (36.7 °C) O2 sat- 96%    --  Documentation assistance provided by lyssa Yang for Dr. Farrar.  Information recorded by the scribe was done at my direction and has been verified and validated by me.     Sophie Yang  01/06/18 1910       Sophie Yang  01/06/18 2053       Clarence Farrar MD  01/06/18 [id number]

## 2018-01-07 NOTE — ED NOTES
Lab called at this time per this RN for CMP results and lab reports that it is in process.     Rufina Ellis RN  01/06/18 1953

## 2018-01-07 NOTE — DISCHARGE INSTRUCTIONS
Use over the counter Tylenol or Ibuprofen as needed for pain and follow up with Dr. Cooper.  You may need physical therapy for your hips.  Please return to the ED if you develop a headache or numbness or weakness to a leg .

## 2018-01-10 ENCOUNTER — OFFICE VISIT (OUTPATIENT)
Dept: ORTHOPEDIC SURGERY | Facility: CLINIC | Age: 83
End: 2018-01-10

## 2018-01-10 VITALS — BODY MASS INDEX: 23.64 KG/M2 | WEIGHT: 120.4 LBS | HEIGHT: 60 IN | TEMPERATURE: 98 F

## 2018-01-10 DIAGNOSIS — M54.16 LUMBAR RADICULOPATHY: Primary | ICD-10-CM

## 2018-01-10 PROCEDURE — 99203 OFFICE O/P NEW LOW 30 MIN: CPT | Performed by: ORTHOPAEDIC SURGERY

## 2018-01-10 RX ORDER — METHYLPREDNISOLONE 4 MG/1
TABLET ORAL
Qty: 21 TABLET | Refills: 0 | Status: SHIPPED | OUTPATIENT
Start: 2018-01-10 | End: 2018-04-09

## 2018-01-24 ENCOUNTER — HOSPITAL ENCOUNTER (OUTPATIENT)
Dept: MRI IMAGING | Facility: HOSPITAL | Age: 83
Discharge: HOME | End: 2018-01-24
Attending: ORTHOPAEDIC SURGERY | Admitting: ORTHOPAEDIC SURGERY

## 2018-01-24 VITALS
DIASTOLIC BLOOD PRESSURE: 59 MMHG | TEMPERATURE: 97.6 F | HEART RATE: 91 BPM | SYSTOLIC BLOOD PRESSURE: 141 MMHG | RESPIRATION RATE: 16 BRPM | OXYGEN SATURATION: 97 %

## 2018-01-24 DIAGNOSIS — M54.16 LUMBAR RADICULOPATHY: ICD-10-CM

## 2018-01-24 PROCEDURE — 72148 MRI LUMBAR SPINE W/O DYE: CPT

## 2018-01-26 ENCOUNTER — TELEPHONE (OUTPATIENT)
Dept: ORTHOPEDIC SURGERY | Facility: CLINIC | Age: 83
End: 2018-01-26

## 2018-01-29 DIAGNOSIS — M54.16 LUMBAR RADICULOPATHY: Primary | ICD-10-CM

## 2018-01-30 ENCOUNTER — TELEPHONE (OUTPATIENT)
Dept: ORTHOPEDIC SURGERY | Facility: CLINIC | Age: 83
End: 2018-01-30

## 2018-01-30 DIAGNOSIS — G89.29 CHRONIC BILATERAL LOW BACK PAIN, WITH SCIATICA PRESENCE UNSPECIFIED: Primary | ICD-10-CM

## 2018-01-30 DIAGNOSIS — M54.5 CHRONIC BILATERAL LOW BACK PAIN, WITH SCIATICA PRESENCE UNSPECIFIED: Primary | ICD-10-CM

## 2018-01-30 NOTE — TELEPHONE ENCOUNTER
----- Message from Coy Cooper MD sent at 1/29/2018 12:52 PM EST -----  Can you please contact her to notify her of the results of her lumbar spine MRI?.  Please explain that the results show multiple areas of pinched nerves which may be causing her pain.  I suggest a epidural steroid injection.  This order has been entered for her and someone from [facility] should contact her about setting this up.  Thank you.    ----- Message -----     From: Interface, Rad Results Marianna In     Sent: 1/25/2018  12:37 PM       To: Coy Cooper MD

## 2018-01-30 NOTE — TELEPHONE ENCOUNTER
Called patient letting her know that MRI results per [facility].   Patient would like to get the Epidural Injection.    I put the order in for the injection and let the patient know that someone from [facility] would be calling her to get that scheduled.

## 2018-02-07 ENCOUNTER — HOSPITAL ENCOUNTER (OUTPATIENT)
Dept: PAIN MEDICINE | Facility: HOSPITAL | Age: 83
Discharge: HOME | End: 2018-02-07
Attending: ORTHOPAEDIC SURGERY | Admitting: ORTHOPAEDIC SURGERY

## 2018-02-07 ENCOUNTER — TRANSCRIBE ORDERS (OUTPATIENT)
Dept: PAIN MEDICINE | Facility: HOSPITAL | Age: 83
End: 2018-02-07

## 2018-02-07 ENCOUNTER — ANESTHESIA EVENT (OUTPATIENT)
Dept: PAIN MEDICINE | Facility: HOSPITAL | Age: 83
End: 2018-02-07

## 2018-02-07 ENCOUNTER — ANESTHESIA (OUTPATIENT)
Dept: PAIN MEDICINE | Facility: HOSPITAL | Age: 83
End: 2018-02-07

## 2018-02-07 ENCOUNTER — HOSPITAL ENCOUNTER (OUTPATIENT)
Dept: GENERAL RADIOLOGY | Facility: HOSPITAL | Age: 83
Discharge: HOME | End: 2018-02-07

## 2018-02-07 VITALS
SYSTOLIC BLOOD PRESSURE: 134 MMHG | HEART RATE: 92 BPM | TEMPERATURE: 97.8 F | BODY MASS INDEX: 23.56 KG/M2 | WEIGHT: 120 LBS | DIASTOLIC BLOOD PRESSURE: 75 MMHG | HEIGHT: 60 IN | OXYGEN SATURATION: 96 % | RESPIRATION RATE: 16 BRPM

## 2018-02-07 DIAGNOSIS — R52 PAIN: ICD-10-CM

## 2018-02-07 DIAGNOSIS — M54.5 CHRONIC BILATERAL LOW BACK PAIN, WITH SCIATICA PRESENCE UNSPECIFIED: Primary | ICD-10-CM

## 2018-02-07 DIAGNOSIS — G89.29 CHRONIC BILATERAL LOW BACK PAIN, WITH SCIATICA PRESENCE UNSPECIFIED: ICD-10-CM

## 2018-02-07 DIAGNOSIS — M54.5 CHRONIC BILATERAL LOW BACK PAIN, WITH SCIATICA PRESENCE UNSPECIFIED: ICD-10-CM

## 2018-02-07 DIAGNOSIS — G89.29 CHRONIC BILATERAL LOW BACK PAIN, WITH SCIATICA PRESENCE UNSPECIFIED: Primary | ICD-10-CM

## 2018-02-07 PROCEDURE — 25010000002 METHYLPREDNISOLONE PER 80 MG: Performed by: ANESTHESIOLOGY

## 2018-02-07 PROCEDURE — C1755 CATHETER, INTRASPINAL: HCPCS

## 2018-02-07 PROCEDURE — 77003 FLUOROGUIDE FOR SPINE INJECT: CPT

## 2018-02-07 PROCEDURE — 0 IOPAMIDOL 41 % SOLUTION: Performed by: ANESTHESIOLOGY

## 2018-02-07 RX ORDER — FENTANYL CITRATE 50 UG/ML
50 INJECTION, SOLUTION INTRAMUSCULAR; INTRAVENOUS
Status: DISCONTINUED | OUTPATIENT
Start: 2018-02-07 | End: 2018-02-08 | Stop reason: HOSPADM

## 2018-02-07 RX ORDER — METHYLPREDNISOLONE ACETATE 80 MG/ML
80 INJECTION, SUSPENSION INTRA-ARTICULAR; INTRALESIONAL; INTRAMUSCULAR; SOFT TISSUE ONCE
Status: COMPLETED | OUTPATIENT
Start: 2018-02-07 | End: 2018-02-07

## 2018-02-07 RX ORDER — SODIUM CHLORIDE 0.9 % (FLUSH) 0.9 %
1-10 SYRINGE (ML) INJECTION AS NEEDED
Status: DISCONTINUED | OUTPATIENT
Start: 2018-02-07 | End: 2018-02-08 | Stop reason: HOSPADM

## 2018-02-07 RX ORDER — LIDOCAINE HYDROCHLORIDE 10 MG/ML
1 INJECTION, SOLUTION INFILTRATION; PERINEURAL ONCE
Status: DISCONTINUED | OUTPATIENT
Start: 2018-02-07 | End: 2018-02-08 | Stop reason: HOSPADM

## 2018-02-07 RX ORDER — MIDAZOLAM HYDROCHLORIDE 1 MG/ML
1 INJECTION INTRAMUSCULAR; INTRAVENOUS
Status: DISCONTINUED | OUTPATIENT
Start: 2018-02-07 | End: 2018-02-08 | Stop reason: HOSPADM

## 2018-02-07 RX ORDER — LATANOPROST 50 UG/ML
1 SOLUTION/ DROPS OPHTHALMIC NIGHTLY
COMMUNITY

## 2018-02-07 RX ADMIN — METHYLPREDNISOLONE ACETATE 80 MG: 80 INJECTION, SUSPENSION INTRA-ARTICULAR; INTRALESIONAL; INTRAMUSCULAR; SOFT TISSUE at 12:33

## 2018-02-07 RX ADMIN — IOPAMIDOL 3 ML: 408 INJECTION, SOLUTION INTRATHECAL at 12:33

## 2018-02-07 NOTE — ANESTHESIA PROCEDURE NOTES
PAIN Epidural block    Patient location during procedure: pain clinic  Start Time: 2/7/2018 12:24 PM  Stop Time: 2/7/2018 12:35 PM  Indication:at surgeon's request and procedure for pain  Performed By  Anesthesiologist: SHILA KIDD  Preanesthetic Checklist  Completed: patient identified, site marked, surgical consent, pre-op evaluation, timeout performed, IV checked, risks and benefits discussed and monitors and equipment checked  Additional Notes  Dx:  Post-Op Diagnosis Codes:     * Lumbar radiculopathy (M54.16)  80 mg depomedrol in epid    Plan : return to clinic as needed  Prep:  Pt Position:prone (prone)  Sterile Tech:cap, gloves, mask and sterile barrier  Prep:chlorhexidine gluconate and isopropyl alcohol  Monitoring:blood pressure monitoring, EKG and continuous pulse oximetry  Procedure:  Sedation: no   Approach:midline  Guidance: fluoroscopy and c arm pa and lat and loss of resistance  Location:lumbar  Level:4-5 (interlaminar)  Needle Type:ViaCubetead  Needle Gauge:20  Aspiration:negative  Medications:  Depomedrol:80 mg  Preservative Free Saline:3mL  Isovue:2mL    Post Assessment:  Post-procedure: bandaid.  Pt Tolerance:patient tolerated the procedure well with no apparent complications  Complications:no

## 2018-02-07 NOTE — H&P
[facility]    History and Physical    Patient Name: Trudy Castano  :  10/12/1932  MRN:  [id number]  Date of Admission: 2018    Subjective     Patient is a 85 y.o. female presents with chief complaint of chronic, constant, moderate, 5/10, ? etiology, gnawing, muscle spasms,  low back and hips: bilateral pain.  Onset of symptoms was gradual starting 4 months ago.  Symptoms are associated/aggravated by activity. Symptoms improve with steroids    The following portions of the patients history were reviewed and updated as appropriate: current medications, allergies, past medical history, past surgical history, past family history, past social history and problem list. LESI in the past with good results                Objective     Past Medical History:   Past Medical History:   Diagnosis Date   • Arrhythmia    • Benign essential hypertension    • Broken bones    • Cancer     skin cancer   • Chronic diastolic CHF (congestive heart failure)    • COPD (chronic obstructive pulmonary disease)    • Depression    • Fatigue    • Glaucoma    • Headache    • Hyperlipidemia    • Osteoporosis    • Palpitations    • Pneumonia    • Premature atrial complex    • Premature ventricular contractions    • Shortness of breath    • Sinus bradycardia    • Vasovagal syncope    • Weak pulse      Past Surgical History:   Past Surgical History:   Procedure Laterality Date   • BILATERAL BREAST REDUCTION     • CARDIAC CATHETERIZATION      OUTCOME: SUCCESSFUL   • CARDIAC CATHETERIZATION N/A 3/25/2016    Procedure: Left Heart Cath;  Surgeon: Phoebe Bowles MD;  Location: [facility] INVASIVE LOCATION;  Service:    • CARDIAC CATHETERIZATION N/A 3/25/2016    Procedure: Left ventriculography;  Surgeon: Phoebe Bowles MD;  Location: [facility] INVASIVE LOCATION;  Service:    • CARDIAC CATHETERIZATION N/A 3/25/2016    Procedure: Coronary angiography;  Surgeon: Phoebe Bowles MD;  Location: [facility] INVASIVE LOCATION;   Service:    • CARDIAC ELECTROPHYSIOLOGY PROCEDURE N/A 2/16/2017    Procedure: Pacemaker DC new  [place];  Surgeon: Phoebe Bowles MD;  Location: [facility] INVASIVE LOCATION;  Service:    • CERVICAL FUSION     • ECTOPIC PREGNANCY     • ELBOW DEBRIDEMENT     • EYE SURGERY     • HEMORRHOIDECTOMY     • HYSTERECTOMY     • INSERT / REPLACE / REMOVE PACEMAKER     • LUMBAR PUNCTURE      large volume lumbar puncture 35ml   • NECK SURGERY     • OOPHORECTOMY      FOR ECTOPIC PREGNANCY   • SPINAL FUSION  2012    C3-C4 Fusion Dr. Millard     Family History:   Family History   Problem Relation Age of Onset   • Heart disease Mother      Social History:   Social History   Substance Use Topics   • Smoking status: Former Smoker     Quit date: 3/1/1967   • Smokeless tobacco: Never Used   • Alcohol use No      Comment: SOCIAL       Vital Signs Range for the last 24 hours  Temperature:     Temp Source:     BP:     Pulse:     Respirations:     SPO2:     O2 Amount (l/min):     O2 Devices     Weight:           --------------------------------------------------------------------------------    Current Outpatient Prescriptions   Medication Sig Dispense Refill   • aspirin 81 MG tablet Take 81 mg by mouth daily.     • cholecalciferol (VITAMIN D3) 1000 UNITS tablet Take 1,000 Units by mouth Daily.     • MethylPREDNISolone (MEDROL) 4 MG tablet Take as directed on package instructions 21 tablet 0   • montelukast (SINGULAIR) 10 MG tablet Take 10 mg by mouth Every Night.     • nebivolol (BYSTOLIC) 2.5 MG tablet Take 1 tablet by mouth Daily. 90 tablet 0   • pravastatin (PRAVACHOL) 40 MG tablet Take 40 mg by mouth Daily.       Current Facility-Administered Medications   Medication Dose Route Frequency Provider Last Rate Last Dose   • fentaNYL citrate (PF) (SUBLIMAZE) injection 50 mcg  50 mcg Intravenous Q5 Min PRN Stanton Bridges MD       • iopamidol (ISOVUE-M 200) injection 41%  2 mL Injection Once in imaging Stanton Bridges MD       •  lidocaine (XYLOCAINE) 1 % injection 1 mL  1 mL Intradermal Once Stanton Bridges MD       • methylPREDNISolone acetate (DEPO-medrol) injection 80 mg  80 mg Intramuscular Once Stanton Bridges MD       • midazolam (VERSED) injection 1 mg  1 mg Intravenous Q5 Min PRN Stanton Bridges MD       • sodium chloride 0.9 % flush 1-10 mL  1-10 mL Intravenous PRN Stanton Bridges MD           --------------------------------------------------------------------------------  Assessment/Plan      Anesthesia Evaluation     Patient summary reviewed and Nursing notes reviewed         Pain impairs ability to perform ADLs: Housekeeping and Meal prep/Eating  Modalities previously tried to control pain with limited effectiveness: OTC medications     Airway   Dental - normal exam     Pulmonary - normal exam   (+) COPD, asthma,   Cardiovascular - normal exam    (+) hypertension, CAD, CHF,       Neuro/Psych- negative ROS  (+)   left straight leg raise test,   right straight leg raise test,    GI/Hepatic/Renal/Endo    (+)  diabetes mellitus,     Musculoskeletal (-) negative ROS and normal exam    Abdominal  - normal exam   Substance History - negative use     OB/GYN negative ob/gyn ROS         Other                 Diagnosis and Plan    Treatment Plan  ASA 3      Procedures: Lumbar Epidural Steroid Injection(LESI), With fluoroscopy,       Anesthetic plan and risks discussed with patient.          Diagnosis     * Lumbar radiculopathy [M54.16]                  PAST MEDICAL HISTORY:  Current Outpatient Prescriptions on File Prior to Encounter   Medication Sig Dispense Refill   • aspirin 81 MG tablet Take 81 mg by mouth daily.     • cholecalciferol (VITAMIN D3) 1000 UNITS tablet Take 1,000 Units by mouth Daily.     • MethylPREDNISolone (MEDROL) 4 MG tablet Take as directed on package instructions 21 tablet 0   • montelukast (SINGULAIR) 10 MG tablet Take 10 mg by mouth Every Night.     • nebivolol (BYSTOLIC) 2.5 MG tablet Take 1 tablet by mouth  Daily. 90 tablet 0   • pravastatin (PRAVACHOL) 40 MG tablet Take 40 mg by mouth Daily.       No current facility-administered medications on file prior to encounter.        Past Medical History:   Diagnosis Date   • Arrhythmia    • Benign essential hypertension    • Broken bones    • Cancer     skin cancer   • Chronic diastolic CHF (congestive heart failure)    • COPD (chronic obstructive pulmonary disease)    • Depression    • Fatigue    • Glaucoma    • Headache    • Hyperlipidemia    • Osteoporosis    • Palpitations    • Pneumonia    • Premature atrial complex    • Premature ventricular contractions    • Shortness of breath    • Sinus bradycardia    • Vasovagal syncope    • Weak pulse          SOCIAL HISTORY:  No tobacco    REVIEW OF SYSTEMS:  No hematologic infectious or constitutional symptoms  Other review of systems non-contributory    PHYSICAL EXAM:  There were no vitals taken for this visit.  Well-developed well-nourished no acute distress  Extra ocular movements intact  Mallampati class 2 airway  Cardiac:  Regular rate and rhythm  Lungs:  Clear to auscultation bilaterally with good effort  Alert and oriented ×3  Deep tendon reflexes normal in the bilateral patella  Positive straight leg raise bilaterally  5 out of 5 strength bilateral upper and lower extremities  Lumbar spine without obvious deformities ecchymoses  Lumbar spine nontender to palpation      DIAGNOSIS:  Post-Op Diagnosis Codes:     * Lumbar radiculopathy [M54.16]    PLAN:  1.  Lumbar epidural steroid injections, up to 3, spaced 1-2 weeks apart.  If pain control is acceptable after 1 or 2 injections, it was discussed with the patient that they may return for the subsequent injections if and when their pain returns.  The risks were discussed with the patient including failure of relief, worsening pain, Headache (post dural puncture headache), bleeding (epidural hematoma) and infection (epidural abscess or skin infection).  2.  Physical therapy  exercises at home as prescribed by physical therapy or from the pain clinic handout (given to the patient).  Continuation of these exercises every day, or multiple times per week, even when the patient has good pain relief, was stressed to the patient as a preventative measure to decrease the frequency and severity of future pain episodes.  3.  Continue pain medicines as already prescribed.  If patient not currently taking any, it is recommended to begin Acetaminophen 1000 mg po q 8 hours.  If other medicines containing Acetaminophen are currently prescribed, maintain daily dose at 3000 mg.    4.  If they can tolerate NSAIDS, it is recommended to take Ibuprofen 600 mg po q 6 hours for 7 days during pain exacerbations.  Alternatively, they may substitute an NSAID of their choice (e.g. Aleve).  This may be taken at the same time as Acetaminophen.  5.  Heat and ice to the affected area as tolerated for pain control.  It was discussed that heating pads can cause burns.  6.  Daily low impact exercise such as walking or water exercise was recommended to maintain overall health and aid in weight control.   7.  Follow up as needed for subsequent injections.  8.  Patient was counseled to abstain from tobacco products.

## 2018-03-07 ENCOUNTER — APPOINTMENT (OUTPATIENT)
Dept: PAIN MEDICINE | Facility: HOSPITAL | Age: 83
End: 2018-03-07

## 2018-04-05 ENCOUNTER — CLINICAL SUPPORT NO REQUIREMENTS (OUTPATIENT)
Dept: CARDIOLOGY | Facility: CLINIC | Age: 83
End: 2018-04-05

## 2018-04-05 DIAGNOSIS — Z95.0 PACEMAKER: Primary | ICD-10-CM

## 2018-04-05 PROCEDURE — 93288 INTERROG EVL PM/LDLS PM IP: CPT | Performed by: INTERNAL MEDICINE

## 2018-04-09 ENCOUNTER — OFFICE VISIT (OUTPATIENT)
Dept: ORTHOPEDIC SURGERY | Facility: CLINIC | Age: 83
End: 2018-04-09

## 2018-04-09 VITALS — BODY MASS INDEX: 23.36 KG/M2 | TEMPERATURE: 98.3 F | HEIGHT: 60 IN | WEIGHT: 119 LBS

## 2018-04-09 DIAGNOSIS — M25.552 HIP PAIN, LEFT: Primary | ICD-10-CM

## 2018-04-09 PROCEDURE — 73502 X-RAY EXAM HIP UNI 2-3 VIEWS: CPT | Performed by: ORTHOPAEDIC SURGERY

## 2018-04-09 PROCEDURE — 99214 OFFICE O/P EST MOD 30 MIN: CPT | Performed by: ORTHOPAEDIC SURGERY

## 2018-04-09 RX ORDER — TRAMADOL HYDROCHLORIDE 50 MG/1
50 TABLET ORAL EVERY 4 HOURS PRN
Qty: 60 TABLET | Refills: 0 | Status: SHIPPED | OUTPATIENT
Start: 2018-04-09 | End: 2018-06-20 | Stop reason: SDUPTHER

## 2018-04-10 NOTE — PROGRESS NOTES
Patient: Trudy Castano    YOB: 1932    Medical Record Number: [id number]    Chief Complaints:  Left hip pain    History of Present Illness:     85 y.o. female patient who presents for evaluation of her left hip.  I last saw her for her back and recommended epidural steroid injections.  She had 1 epidural and she tells me that it helped tremendously.  She never ended up getting another epidural because her pain was 100% resolved.  She tells me that she fell on March 16, landing awkwardly on her left hip.  The fall was result of her foot getting hung up underneath an ottoman in her home.  She struck the couch with her left hip as she fell.  Since the fall, she has been having groin and lateral sided hip pain which is worse with weightbearing.  She tells me that she has trouble bearing weight on the left leg.  She has been using a cane and/or walker.  She describes her pain as severe, constant, and aching.  Anti-inflammatories have helped somewhat.    Allergies: No Known Allergies    Home Medications:    Current Outpatient Prescriptions:   •  aspirin 81 MG tablet, Take 81 mg by mouth daily., Disp: , Rfl:   •  cholecalciferol (VITAMIN D3) 1000 UNITS tablet, Take 1,000 Units by mouth Daily., Disp: , Rfl:   •  latanoprost (XALATAN) 0.005 % ophthalmic solution, 1 drop Every Night., Disp: , Rfl:   •  montelukast (SINGULAIR) 10 MG tablet, Take 10 mg by mouth Every Night., Disp: , Rfl:   •  nebivolol (BYSTOLIC) 2.5 MG tablet, Take 1 tablet by mouth Daily., Disp: 90 tablet, Rfl: 0  •  pravastatin (PRAVACHOL) 40 MG tablet, Take 40 mg by mouth Daily., Disp: , Rfl:   •  traMADol (ULTRAM) 50 MG tablet, Take 1 tablet by mouth Every 4 (Four) Hours As Needed for Moderate Pain ., Disp: 60 tablet, Rfl: 0    Past Medical History:   Diagnosis Date   • Arrhythmia    • Benign essential hypertension    • Broken bones    • Cancer     skin cancer   • Chronic diastolic CHF (congestive heart failure)    • COPD (chronic  obstructive pulmonary disease)    • Depression    • Fatigue    • Glaucoma    • Headache    • Hyperlipidemia    • Osteoporosis    • Palpitations    • Pneumonia    • Premature atrial complex    • Premature ventricular contractions    • Shortness of breath    • Sinus bradycardia    • Vasovagal syncope    • Weak pulse        Past Surgical History:   Procedure Laterality Date   • BILATERAL BREAST REDUCTION     • CARDIAC CATHETERIZATION      OUTCOME: SUCCESSFUL   • CARDIAC CATHETERIZATION N/A 3/25/2016    Procedure: Left Heart Cath;  Surgeon: Phoebe Bowles MD;  Location: [facility] CATH INVASIVE LOCATION;  Service:    • CARDIAC CATHETERIZATION N/A 3/25/2016    Procedure: Left ventriculography;  Surgeon: Phoebe Bowles MD;  Location: [facility] CATH INVASIVE LOCATION;  Service:    • CARDIAC CATHETERIZATION N/A 3/25/2016    Procedure: Coronary angiography;  Surgeon: Phoebe Bowles MD;  Location: [facility] CATH INVASIVE LOCATION;  Service:    • CARDIAC ELECTROPHYSIOLOGY PROCEDURE N/A 2/16/2017    Procedure: Pacemaker DC new  [place];  Surgeon: Phoebe Bowles MD;  Location: [facility] CATH INVASIVE LOCATION;  Service:    • CERVICAL FUSION     • ECTOPIC PREGNANCY     • ELBOW DEBRIDEMENT     • EYE SURGERY     • HEMORRHOIDECTOMY     • HYSTERECTOMY     • INSERT / REPLACE / REMOVE PACEMAKER     • LUMBAR PUNCTURE      large volume lumbar puncture 35ml   • NECK SURGERY     • OOPHORECTOMY      FOR ECTOPIC PREGNANCY   • SPINAL FUSION  2012    C3-C4 Fusion Dr. Millard       Social History     Occupational History   • Not on file.     Social History Main Topics   • Smoking status: Former Smoker     Quit date: 3/1/1967   • Smokeless tobacco: Never Used   • Alcohol use No      Comment: SOCIAL   • Drug use: No   • Sexual activity: Defer      Social History     Social History Narrative   • No narrative on file       Family History   Problem Relation Age of Onset   • Heart disease Mother        Review of Systems:  "     Constitutional: Denies fever, shaking or chills   Eyes: Denies change in visual acuity   HEENT: Denies nasal congestion or sore throat   Respiratory: Denies cough or shortness of breath   Cardiovascular: Denies chest pain or edema  Endocrine: Denies tremors, palpitations, intolerance of heat or cold, polyuria, polydipsia.  GI: Denies abdominal pain, nausea, vomiting, bloody stools or diarrhea  : Denies frequency, urgency, incontinence, retention, or nocturia.  Musculoskeletal: Denies numbness tingling or loss of motor function except as above  Integument: Denies rash, lesion or ulceration   Neurologic: Denies headache or focal weakness, deficits  Heme: Denies epistaxis, spontaneous or excessive bleeding, epistaxis, hematuria, melena, fatigue, enlarged or tender lymph nodes.      All other pertinent positives and negatives as noted above in HPI.    Physical Exam: 85 y.o. female  Vitals:    04/09/18 1346   Temp: 98.3 °F (36.8 °C)   TempSrc: Temporal Artery    Weight: 54 kg (119 lb)   Height: 152.4 cm (60\")       General:  Patient is awake and alert.  Appears in no acute distress or discomfort.    Psych:  Affect and demeanor are appropriate.    Eyes:  Conjunctiva and sclera appear grossly normal.  Eyes track well and EOM seem to be intact.    Ears:  No gross abnormalities.  Hearing adequate for the exam.    Cardiovascular:  Regular rate and rhythm.    Lungs:  Good chest expansion.  Breathing unlabored.    Extremities: Left hip is examined.  Skin is benign.  No focal areas of tenderness.  She has good hip motion although extremes of internal and external rotation are mildly uncomfortable.  No pain with a gentle logroll maneuver.  Positive Stinchfield maneuver for groin pain.  No evident hip instability although her exam is somewhat guarded.  She has good strength with hip flexion, knee extension, ankle and toe plantar flexion dorsally.  Sensation is intact.  Brisk capillary refill.    Imaging:  AP pelvis and " lateral view left hip are ordered and reviewed.  No comparison films are immediately available.  I do not see any obvious acute abnormalities.    Assessment/Plan:  Left hip pain status post fall, possible fracture    Given the traumatic nature of her injury and difficulty bearing weight, I recommend further workup with an MRI.  I've agreed to give her a limited prescription for tramadol to take as needed.  The risk of this medicine was discussed.  I will call her with the results of the pelvis MRI and then we will come up with a plan.  In the meantime, I have encouraged her to continue use of a cane and/or walker to try to offload the hip.    Coy Cooper MD    04/09/2018

## 2018-04-20 ENCOUNTER — HOSPITAL ENCOUNTER (OUTPATIENT)
Dept: MRI IMAGING | Facility: HOSPITAL | Age: 83
Discharge: HOME | End: 2018-04-20
Attending: ORTHOPAEDIC SURGERY | Admitting: ORTHOPAEDIC SURGERY

## 2018-04-20 DIAGNOSIS — M25.552 HIP PAIN, LEFT: ICD-10-CM

## 2018-04-20 PROCEDURE — 72195 MRI PELVIS W/O DYE: CPT

## 2018-04-20 NOTE — NURSING NOTE
[employer] stopped pacer.  Patient now in MRI for pelvic scan.  Heart monitor attached to patient.

## 2018-04-23 ENCOUNTER — TELEPHONE (OUTPATIENT)
Dept: ORTHOPEDIC SURGERY | Facility: CLINIC | Age: 83
End: 2018-04-23

## 2018-04-23 DIAGNOSIS — M25.552 LEFT HIP PAIN: Primary | ICD-10-CM

## 2018-04-23 NOTE — TELEPHONE ENCOUNTER
1729 04/23/18 PER [facility] MRI SHOWS NO FRACTURE BUT A MUSCLE STRAIN- PT THERAPY CAN HELP AS WELL AS AN INJECTION.PT VOICED UNDERSTANDING. PT WANTING THE REFERRAL FOR PT AND SHE WILL CALL TO MAKE AN APPT FOR AN INJECTION.    PT REFERRAL IN EPIC WKT

## 2018-04-25 ENCOUNTER — APPOINTMENT (OUTPATIENT)
Dept: PAIN MEDICINE | Facility: HOSPITAL | Age: 83
End: 2018-04-25
Attending: ORTHOPAEDIC SURGERY

## 2018-04-25 RX ORDER — NEBIVOLOL 2.5 MG/1
2.5 TABLET ORAL DAILY
Qty: 90 TABLET | Refills: 1 | Status: SHIPPED | OUTPATIENT
Start: 2018-04-25 | End: 2018-10-29 | Stop reason: SDUPTHER

## 2018-04-30 ENCOUNTER — HOSPITAL ENCOUNTER (OUTPATIENT)
Dept: PHYSICAL THERAPY | Facility: HOSPITAL | Age: 83
Setting detail: THERAPIES SERIES
Discharge: HOME | End: 2018-04-30
Attending: ORTHOPAEDIC SURGERY

## 2018-04-30 DIAGNOSIS — M25.552 LEFT HIP PAIN: Primary | ICD-10-CM

## 2018-04-30 PROCEDURE — G8978 MOBILITY CURRENT STATUS: HCPCS

## 2018-04-30 PROCEDURE — 97161 PT EVAL LOW COMPLEX 20 MIN: CPT

## 2018-04-30 PROCEDURE — G8979 MOBILITY GOAL STATUS: HCPCS

## 2018-04-30 PROCEDURE — 97110 THERAPEUTIC EXERCISES: CPT

## 2018-05-09 ENCOUNTER — HOSPITAL ENCOUNTER (OUTPATIENT)
Dept: PHYSICAL THERAPY | Facility: HOSPITAL | Age: 83
Setting detail: THERAPIES SERIES
Discharge: HOME | End: 2018-05-09

## 2018-05-09 DIAGNOSIS — M25.552 LEFT HIP PAIN: Primary | ICD-10-CM

## 2018-05-09 PROCEDURE — 97110 THERAPEUTIC EXERCISES: CPT

## 2018-05-09 PROCEDURE — 97140 MANUAL THERAPY 1/> REGIONS: CPT

## 2018-05-09 NOTE — THERAPY TREATMENT NOTE
Outpatient Physical Therapy Ortho Treatment Note   [facility]     Patient Name: Trudy Castano  : 10/12/1932  MRN: [id number]  Today's Date: 2018      Visit Date: 2018    Visit Dx:    ICD-10-CM ICD-9-CM   1. Left hip pain M25.552 719.45       Patient Active Problem List   Diagnosis   • Echocardiogram abnormal   • Abnormal electrocardiogram   • Depression   • Diabetes mellitus   • Fatigue   • Glaucoma   • Hyperlipidemia   • Hypertension   • Osteoporosis   • Palpitations   • Premature atrial contraction   • Ventricular premature beats   • Sinus bradycardia   • Shortness of breath   • Vasovagal syncope   • Weak pulse   • Communicating hydrocephalus   • Intermittent asthma   • Oropharyngeal dysphagia   • Precordial pain   • Abnormal cardiovascular stress test   • Chronic coronary artery disease   • Supraventricular tachycardia   • Bradycardia   • Coronary artery disease involving native coronary artery of native heart without angina pectoris   • Pacemaker        Past Medical History:   Diagnosis Date   • Arrhythmia    • Benign essential hypertension    • Broken bones    • Cancer     skin cancer   • Chronic diastolic CHF (congestive heart failure)    • COPD (chronic obstructive pulmonary disease)    • Depression    • Fatigue    • Glaucoma    • Headache    • Hyperlipidemia    • Osteoporosis    • Palpitations    • Pneumonia    • Premature atrial complex    • Premature ventricular contractions    • Shortness of breath    • Sinus bradycardia    • Vasovagal syncope    • Weak pulse         Past Surgical History:   Procedure Laterality Date   • BILATERAL BREAST REDUCTION     • CARDIAC CATHETERIZATION      OUTCOME: SUCCESSFUL   • CARDIAC CATHETERIZATION N/A 3/25/2016    Procedure: Left Heart Cath;  Surgeon: Phoebe Bowles MD;  Location: [facility] INVASIVE LOCATION;  Service:    • CARDIAC CATHETERIZATION N/A 3/25/2016    Procedure: Left ventriculography;  Surgeon: Phoebe Bowles MD;  Location:   [facility] CATH INVASIVE LOCATION;  Service:    • CARDIAC CATHETERIZATION N/A 3/25/2016    Procedure: Coronary angiography;  Surgeon: Phoebe Bowles MD;  Location: [facility] CATH INVASIVE LOCATION;  Service:    • CARDIAC ELECTROPHYSIOLOGY PROCEDURE N/A 2/16/2017    Procedure: Pacemaker DC new  [place];  Surgeon: Phoebe Bowles MD;  Location: [facility] CATH INVASIVE LOCATION;  Service:    • CERVICAL FUSION     • ECTOPIC PREGNANCY     • ELBOW DEBRIDEMENT     • EYE SURGERY     • HEMORRHOIDECTOMY     • HYSTERECTOMY     • INSERT / REPLACE / REMOVE PACEMAKER     • LUMBAR PUNCTURE      large volume lumbar puncture 35ml   • NECK SURGERY     • OOPHORECTOMY      FOR ECTOPIC PREGNANCY   • SPINAL FUSION  2012    C3-C4 Fusion Dr. Millard                             PT Assessment/Plan     Row Name 05/09/18 9552          PT Assessment    Assessment Comments Pt returns for initial follow up after evaluation and reports compliance with current HEP. Progressed exercises today and held on standing/SLS exercises at this time to allow tissues to heal. Updated handouts for HEP.   -CN        PT Plan    PT Plan Comments Assess response to added exercises and consider shoulder extension with TA and sidestepping next visit.   -CN       User Key  (r) = Recorded By, (t) = Taken By, (c) = Cosigned By    Initials Name Provider Type    CN Latoya Schaeffer, PT Physical Therapist                    Exercises     Row Name 05/09/18 1700             Subjective Comments    Subjective Comments It feels ok today. I have been doing the exercises at home because that's how I'll get well.   -CN         Subjective Pain    Able to rate subjective pain? yes  -CN      Pre-Treatment Pain Level 4  -CN         Exercise 1    Exercise Name 1 modified piriformis stretch  -CN      Reps 1 3  -CN      Time 1 20   -CN         Exercise 2    Exercise Name 2 HL hip adduction  -CN      Reps 2 15  -CN      Time 2 5  -CN         Exercise 3    Exercise Name 3 HL hip  abduction  -CN      Reps 3 10  -CN      Additional Comments RTB  -CN         Exercise 4    Exercise Name 4 LTR  -CN      Reps 4 10  -CN      Time 4 5 sec  -CN         Exercise 5    Exercise Name 5 Nustep, L3 with UEs  -CN      Cueing 5 Verbal  -CN      Time 5 5  -CN         Exercise 6    Exercise Name 6 Supine TA with marches  -CN      Cueing 6 Verbal  -CN      Sets 6 2  -CN      Reps 6 10  -CN         Exercise 7    Exercise Name 7 Supine HS ball roll ins with TA  -CN      Cueing 7 Verbal  -CN      Sets 7 2  -CN      Reps 7 10  -CN         Exercise 8    Exercise Name 8 SL clamshells  -CN      Cueing 8 Verbal  -CN      Reps 8 10 ea  -CN         Exercise 9    Exercise Name 9 90/90 HS stretch  -CN      Cueing 9 Demo;Verbal  -CN      Reps 9 3  -CN      Time 9 20 sec  -CN        User Key  (r) = Recorded By, (t) = Taken By, (c) = Cosigned By    Initials Name Provider Type    KRISTINA Schaeffer, PT Physical Therapist                        Manual Rx (last 36 hours)      Manual Treatments     Row Name 05/09/18 1700             Manual Rx 1    Manual Rx 1 Location gentle stm to L piriformis/glute med, pt in R SLing with pillow between knees  -CN      Manual Rx 1 Duration --  -CN         Manual Rx 2    Manual Rx 2 Location Manual hip isometrics into abduction and adduction  -CN      Manual Rx 2 Duration 10 min  -CN        User Key  (r) = Recorded By, (t) = Taken By, (c) = Cosigned By    Initials Name Provider Type    KRISTINA Schaeffer, PT Physical Therapist                PT OP Goals     Row Name 05/09/18 1700          PT Short Term Goals    STG Date to Achieve 05/14/18  -CN     STG 1 Pt will demonstrate understanding and compliance with initial HEP.  -CN     STG 1 Progress Progressing  -CN     STG 1 Progress Comments Pt reports compliance with current HEP, updated today.   -CN     STG 2 Pt will report use of ice and appropriate activity modifications to allow L hip healing.   -CN     STG 2 Progress Ongoing  -CN      STG 3 Pt will tolerate 45 minute flexibility and stabilization exercises without inc L hip pain.  -CN     STG 3 Progress Ongoing  -CN     STG 3 Progress Comments Able to tolerate 45 min of low level stretching and strengthening exercises today.   -CN        Long Term Goals    LTG Date to Achieve 05/30/18  -CN     LTG 1 Pt will report improved tolerance to walking from 10 minutes to 30 minutes.  -CN     LTG 1 Progress Ongoing  -CN     LTG 2 Pt will report reduced L hip pain at worse from 6/10 to 3/10 or better.  -CN     LTG 2 Progress Ongoing  -CN     LTG 3 Pt will demonstrate reduced overall disability from 65% disability to 50% or less.  -CN     LTG 3 Progress Ongoing  -CN       User Key  (r) = Recorded By, (t) = Taken By, (c) = Cosigned By    Initials Name Provider Type    KRISTINA Schaeffer, PT Physical Therapist          Therapy Education  Given: HEP, Symptoms/condition management, Pain management, Fall prevention and home safety  Program: Progressed  How Provided: Verbal, Demonstration, Written  Provided to: Patient  Level of Understanding: Teach back education performed, Verbalized, Demonstrated              Time Calculation:   Start Time: 1717  Stop Time: 1802  Time Calculation (min): 45 min  Total Timed Code Minutes- PT: 45 minute(s)    Therapy Charges for Today     Code Description Service Date Service Provider Modifiers Qty    [id number] HC PT THER PROC EA 15 MIN 5/9/2018 Latoya Schaeffer PT GP 2    [id number] HC PT MANUAL THERAPY EA 15 MIN 5/9/2018 Latoya Schaeffer PT GP 1                    Latoya Schaeffer PT  5/9/2018

## 2018-05-14 ENCOUNTER — HOSPITAL ENCOUNTER (OUTPATIENT)
Dept: PHYSICAL THERAPY | Facility: HOSPITAL | Age: 83
Setting detail: THERAPIES SERIES
Discharge: HOME | End: 2018-05-14

## 2018-05-14 DIAGNOSIS — M25.552 LEFT HIP PAIN: Primary | ICD-10-CM

## 2018-05-14 PROCEDURE — 97110 THERAPEUTIC EXERCISES: CPT | Performed by: PHYSICAL THERAPIST

## 2018-05-14 NOTE — THERAPY TREATMENT NOTE
Outpatient Physical Therapy Ortho Treatment Note   [facility]     Patient Name: Trudy Castano  : 10/12/1932  MRN: [id number]  Today's Date: 2018      Visit Date: 2018    Visit Dx:    ICD-10-CM ICD-9-CM   1. Left hip pain M25.552 719.45       Patient Active Problem List   Diagnosis   • Echocardiogram abnormal   • Abnormal electrocardiogram   • Depression   • Diabetes mellitus   • Fatigue   • Glaucoma   • Hyperlipidemia   • Hypertension   • Osteoporosis   • Palpitations   • Premature atrial contraction   • Ventricular premature beats   • Sinus bradycardia   • Shortness of breath   • Vasovagal syncope   • Weak pulse   • Communicating hydrocephalus   • Intermittent asthma   • Oropharyngeal dysphagia   • Precordial pain   • Abnormal cardiovascular stress test   • Chronic coronary artery disease   • Supraventricular tachycardia   • Bradycardia   • Coronary artery disease involving native coronary artery of native heart without angina pectoris   • Pacemaker        Past Medical History:   Diagnosis Date   • Arrhythmia    • Benign essential hypertension    • Broken bones    • Cancer     skin cancer   • Chronic diastolic CHF (congestive heart failure)    • COPD (chronic obstructive pulmonary disease)    • Depression    • Fatigue    • Glaucoma    • Headache    • Hyperlipidemia    • Osteoporosis    • Palpitations    • Pneumonia    • Premature atrial complex    • Premature ventricular contractions    • Shortness of breath    • Sinus bradycardia    • Vasovagal syncope    • Weak pulse         Past Surgical History:   Procedure Laterality Date   • BILATERAL BREAST REDUCTION     • CARDIAC CATHETERIZATION      OUTCOME: SUCCESSFUL   • CARDIAC CATHETERIZATION N/A 3/25/2016    Procedure: Left Heart Cath;  Surgeon: Phoebe Bowles MD;  Location: [facility] INVASIVE LOCATION;  Service:    • CARDIAC CATHETERIZATION N/A 3/25/2016    Procedure: Left ventriculography;  Surgeon: Phoebe Bowles MD;   "Location:  [facility] CATH INVASIVE LOCATION;  Service:    • CARDIAC CATHETERIZATION N/A 3/25/2016    Procedure: Coronary angiography;  Surgeon: Phoebe Bowles MD;  [facility];  Service:    • CARDIAC ELECTROPHYSIOLOGY PROCEDURE N/A 2/16/2017    Procedure: Pacemaker DC new  [place];  Surgeon: Phoebe Bowles MD;  Location: [facility] CATH INVASIVE LOCATION;  Service:    • CERVICAL FUSION     • ECTOPIC PREGNANCY     • ELBOW DEBRIDEMENT     • EYE SURGERY     • HEMORRHOIDECTOMY     • HYSTERECTOMY     • INSERT / REPLACE / REMOVE PACEMAKER     • LUMBAR PUNCTURE      large volume lumbar puncture 35ml   • NECK SURGERY     • OOPHORECTOMY      FOR ECTOPIC PREGNANCY   • SPINAL FUSION  2012    C3-C4 Fusion Dr. Millard                             PT Assessment/Plan     Row Name 05/14/18 6796          PT Assessment    Assessment Comments Patient denies increase in symptoms with therex.  She may benefit from initiating closed chain exercise and functional balance next visit per her history of falls.  She was receptive to stair training with cane in clinic.  -GR        PT Plan    PT Plan Comments Add functional balance as appropriate.  -GR       User Key  (r) = Recorded By, (t) = Taken By, (c) = Cosigned By    Initials Name Provider Type    GR Bharath Hartmann, PT Physical Therapist                    Exercises     Row Name 05/14/18 1800             Subjective Comments    Subjective Comments \"Man was I sore yesterday, today I'm pretty good. No pain really.\"  Dtr asks if we can teach her how to use her new cane on stairs.  -GR         Subjective Pain    Able to rate subjective pain? yes  -GR      Pre-Treatment Pain Level 0  -GR         Exercise 1    Exercise Name 1 modified piriformis stretch  -GR      Cueing 1 Verbal  -GR      Reps 1 3  -GR      Time 1 20   -GR         Exercise 2    Exercise Name 2 HL hip adduction  -GR      Reps 2 15  -GR      Time 2 5  -GR         Exercise 3    Exercise Name 3 HL " "hip abduction  -GR      Reps 3 15  -GR      Time 3 5 seconds  -GR      Additional Comments RTB  -GR         Exercise 4    Exercise Name 4 LTR  -GR      Reps 4 10  -GR      Time 4 5 sec  -GR         Exercise 5    Exercise Name 5 Nustep L3 UE/LE  -GR      Time 5 5 min  -GR         Exercise 6    Exercise Name 6 Supine TA with marches  -GR      Cueing 6 Verbal  -GR      Sets 6 2  -GR      Reps 6 10  -GR      Additional Comments slow and controlled  -GR         Exercise 7    Exercise Name 7 DKTC  -GR      Cueing 7 Verbal  -GR      Sets 7 2  -GR      Reps 7 10  -GR      Additional Comments with swiss ball  -GR         Exercise 8    Exercise Name 8 SL clamshells  -GR      Cueing 8 Verbal  -GR      Reps 8 15  -GR         Exercise 9    Exercise Name 9 90/90 HS stretch  -GR      Cueing 9 Demo;Verbal  -GR      Reps 9 3  -GR      Time 9 20 sec  -GR         Exercise 10    Exercise Name 10 Stair training with SC and single rail  -GR      Cueing 10 Demo  -GR      Reps 10 4 6\" steps x 2  -GR      Additional Comments avoid cane behind ascending; lead with L down  -GR         Exercise 11    Exercise Name 11 Bridge  -GR      Cueing 11 Demo  -GR      Sets 11 1  -GR      Reps 11 10  -GR      Time 11 3 seconds  -GR      Additional Comments slow and controlled  -GR         Exercise 12    Exercise Name 12 Supine ITB stretch with strap  -GR      Cueing 12 Demo  -GR      Reps 12 3  -GR      Time 12 20 seconds  -GR        User Key  (r) = Recorded By, (t) = Taken By, (c) = Cosigned By    Initials Name Provider Type    GR Bharath Hartmann, PT Physical Therapist                               PT OP Goals     Row Name 05/14/18 1800          PT Short Term Goals    STG Date to Achieve 05/14/18  -GR     STG 1 Pt will demonstrate understanding and compliance with initial HEP.  -GR     STG 1 Progress Progressing  -GR     STG 2 Pt will report use of ice and appropriate activity modifications to allow L hip healing.   -GR     STG 2 Progress Ongoing  -GR  "    STG 3 Pt will tolerate 45 minute flexibility and stabilization exercises without inc L hip pain.  -GR     STG 3 Progress Ongoing  -GR        Long Term Goals    LTG Date to Achieve 05/30/18  -GR     LTG 1 Pt will report improved tolerance to walking from 10 minutes to 30 minutes.  -GR     LTG 1 Progress Ongoing  -GR     LTG 2 Pt will report reduced L hip pain at worse from 6/10 to 3/10 or better.  -GR     LTG 2 Progress Ongoing  -GR     LTG 3 Pt will demonstrate reduced overall disability from 65% disability to 50% or less.  -GR     LTG 3 Progress Ongoing  -GR       User Key  (r) = Recorded By, (t) = Taken By, (c) = Cosigned By    Initials Name Provider Type    GR Bharath Hartmann PT Physical Therapist          Therapy Education  Education Details: use of cane with steps  Given: Fall prevention and home safety  Program: New  How Provided: Verbal, Demonstration  Provided to: Patient  Level of Understanding: Teach back education performed, Demonstrated              Time Calculation:   Start Time: 1615  Stop Time: 1656  Time Calculation (min): 41 min  Total Timed Code Minutes- PT: 41 minute(s)    Therapy Charges for Today     Code Description Service Date Service Provider Modifiers Qty    60367297207 HC PT THER PROC EA 15 MIN 5/14/2018 Bharath Hartmann, PT GP 3                    Bharath Hartmann PT  5/14/2018

## 2018-05-16 ENCOUNTER — HOSPITAL ENCOUNTER (OUTPATIENT)
Dept: PHYSICAL THERAPY | Facility: HOSPITAL | Age: 83
Setting detail: THERAPIES SERIES
Discharge: HOME | End: 2018-05-16

## 2018-05-16 DIAGNOSIS — M25.552 LEFT HIP PAIN: Primary | ICD-10-CM

## 2018-05-16 PROCEDURE — 97110 THERAPEUTIC EXERCISES: CPT

## 2018-05-16 NOTE — THERAPY TREATMENT NOTE
Outpatient Physical Therapy Ortho Treatment Note   [facility]     Patient Name: Trudy Castano  : 10/12/1932  MRN: [id number]  Today's Date: 2018      Visit Date: 2018    Visit Dx:    ICD-10-CM ICD-9-CM   1. Left hip pain M25.552 719.45       Patient Active Problem List   Diagnosis   • Echocardiogram abnormal   • Abnormal electrocardiogram   • Depression   • Diabetes mellitus   • Fatigue   • Glaucoma   • Hyperlipidemia   • Hypertension   • Osteoporosis   • Palpitations   • Premature atrial contraction   • Ventricular premature beats   • Sinus bradycardia   • Shortness of breath   • Vasovagal syncope   • Weak pulse   • Communicating hydrocephalus   • Intermittent asthma   • Oropharyngeal dysphagia   • Precordial pain   • Abnormal cardiovascular stress test   • Chronic coronary artery disease   • Supraventricular tachycardia   • Bradycardia   • Coronary artery disease involving native coronary artery of native heart without angina pectoris   • Pacemaker        Past Medical History:   Diagnosis Date   • Arrhythmia    • Benign essential hypertension    • Broken bones    • Cancer     skin cancer   • Chronic diastolic CHF (congestive heart failure)    • COPD (chronic obstructive pulmonary disease)    • Depression    • Fatigue    • Glaucoma    • Headache    • Hyperlipidemia    • Osteoporosis    • Palpitations    • Pneumonia    • Premature atrial complex    • Premature ventricular contractions    • Shortness of breath    • Sinus bradycardia    • Vasovagal syncope    • Weak pulse         Past Surgical History:   Procedure Laterality Date   • BILATERAL BREAST REDUCTION     • CARDIAC CATHETERIZATION      OUTCOME: SUCCESSFUL   • CARDIAC CATHETERIZATION N/A 3/25/2016    Procedure: Left Heart Cath;  Surgeon: Phoebe Bowles MD;  Location: [facility] INVASIVE LOCATION;  Service:    • CARDIAC CATHETERIZATION N/A 3/25/2016    Procedure: Left ventriculography;  Surgeon: Phoebe Bowles MD;   Location:  [facility] CATH INVASIVE LOCATION;  Service:    • CARDIAC CATHETERIZATION N/A 3/25/2016    Procedure: Coronary angiography;  Surgeon: Phoebe Bowles MD;  [facility];  Service:    • CARDIAC ELECTROPHYSIOLOGY PROCEDURE N/A 2/16/2017    Procedure: Pacemaker DC new  [place];  Surgeon: Phoebe Bowles MD;  Location: [facility] CATH INVASIVE LOCATION;  Service:    • CERVICAL FUSION     • ECTOPIC PREGNANCY     • ELBOW DEBRIDEMENT     • EYE SURGERY     • HEMORRHOIDECTOMY     • HYSTERECTOMY     • INSERT / REPLACE / REMOVE PACEMAKER     • LUMBAR PUNCTURE      large volume lumbar puncture 35ml   • NECK SURGERY     • OOPHORECTOMY      FOR ECTOPIC PREGNANCY   • SPINAL FUSION  2012    C3-C4 Fusion Dr. Millard                             PT Assessment/Plan     Row Name 05/16/18 1802          PT Assessment    Assessment Comments Initiated balance training in // bars today and pt able to perform with 1 UE assist. Pt tends to move quickly through exercises and has difficutly stabilizing with slow pace. Performed cone weaving with SPC and cues given for increased step length and heel strike as pt tends to shuffle feet. Discussed safety at home as pt does not use cane at home and is ascending/descending stairs with laundry. Encouraged pt to allow family to carry laundry and use B HRs with stairs.   -CN        PT Plan    PT Plan Comments Assess compliance with SPC at home and consider adding dual tasking with balance activities.   -CN       User Key  (r) = Recorded By, (t) = Taken By, (c) = Cosigned By    Initials Name Provider Type    CN Latoya Schaeffer, PT Physical Therapist                    Exercises     Row Name 05/16/18 1600             Subjective Comments    Subjective Comments It feels ok, I haven't had any pain since over the weekend. Per daughter, would like to discuss stairs at home with pt as she carries laundry up/down stairs.   -CN         Subjective Pain    Able to rate  "subjective pain? yes  -CN      Pre-Treatment Pain Level 0  -CN         Exercise 1    Exercise Name 1 modified piriformis stretch  -CN      Cueing 1 Verbal  -CN      Reps 1 3  -CN      Time 1 20   -CN         Exercise 2    Exercise Name 2 HL hip adduction  -CN      Reps 2 15  -CN      Time 2 5  -CN         Exercise 3    Exercise Name 3 HL hip abduction  -CN      Reps 3 15  -CN      Time 3 5 seconds  -CN      Additional Comments GTB  -CN         Exercise 4    Exercise Name 4 LTR  -CN      Reps 4 10  -CN      Time 4 5 sec  -CN         Exercise 5    Exercise Name 5 Nustep L4 UE/LE  -CN      Time 5 5 min  -CN         Exercise 6    Exercise Name 6 Supine TA with marches  -CN      Cueing 6 Verbal  -CN      Sets 6 2  -CN      Reps 6 10  -CN      Additional Comments slow, controlled  -CN         Exercise 7    Exercise Name 7 DKTC  -CN      Cueing 7 Verbal  -CN      Sets 7 2  -CN      Reps 7 10  -CN      Additional Comments with swiss ball  -CN         Exercise 9    Exercise Name 9 90/90 HS stretch  -CN      Cueing 9 Demo;Verbal  -CN      Reps 9 3  -CN      Time 9 20 sec  -CN         Exercise 10    Exercise Name 10 Stair training with SC and single rail  -CN      Cueing 10 Demo  -CN      Reps 10 4 6\" steps x 2  -CN         Exercise 11    Exercise Name 11 Bridge  -CN      Cueing 11 Demo  -CN      Sets 11 1  -CN      Reps 11 10  -CN      Time 11 3 seconds  -CN         Exercise 13    Exercise Name 13 // bars: high knee marches, sidestepping, retro  -CN      Cueing 13 Demo  -CN      Reps 13 4 laps  -CN      Additional Comments CGA  -CN         Exercise 14    Exercise Name 14 Cone weaving with SPC  -CN      Cueing 14 Demo  -CN      Reps 14 3  -CN      Additional Comments cueing for increased step length, heel strike  -CN        User Key  (r) = Recorded By, (t) = Taken By, (c) = Cosigned By    Initials Name Provider Type    KRISTINA Schaeffer PT Physical Therapist                             Therapy Education  Given: Fall " prevention and home safety  Program: Progressed  How Provided: Verbal, Demonstration  Provided to: Patient  Level of Understanding: Teach back education performed, Demonstrated              Time Calculation:   Start Time: 1630  Stop Time: 1715  Time Calculation (min): 45 min  Total Timed Code Minutes- PT: 45 minute(s)    Therapy Charges for Today     Code Description Service Date Service Provider Modifiers Qty    07717861490 HC PT THER PROC EA 15 MIN 5/16/2018 Latoya Schaeffer, PT GP 3                    Latoya Schaeffer, PT  5/16/2018

## 2018-05-21 ENCOUNTER — HOSPITAL ENCOUNTER (OUTPATIENT)
Dept: PHYSICAL THERAPY | Facility: HOSPITAL | Age: 83
Setting detail: THERAPIES SERIES
Discharge: HOME | End: 2018-05-21

## 2018-05-21 DIAGNOSIS — M25.552 LEFT HIP PAIN: Primary | ICD-10-CM

## 2018-05-21 PROCEDURE — 97110 THERAPEUTIC EXERCISES: CPT

## 2018-05-21 NOTE — THERAPY TREATMENT NOTE
Outpatient Physical Therapy Ortho Treatment Note   [facility]     Patient Name: Trudy Castano  : 10/12/1932  MRN: [id number]  Today's Date: 2018      Visit Date: 2018    Visit Dx:    ICD-10-CM ICD-9-CM   1. Left hip pain M25.552 719.45       Patient Active Problem List   Diagnosis   • Echocardiogram abnormal   • Abnormal electrocardiogram   • Depression   • Diabetes mellitus   • Fatigue   • Glaucoma   • Hyperlipidemia   • Hypertension   • Osteoporosis   • Palpitations   • Premature atrial contraction   • Ventricular premature beats   • Sinus bradycardia   • Shortness of breath   • Vasovagal syncope   • Weak pulse   • Communicating hydrocephalus   • Intermittent asthma   • Oropharyngeal dysphagia   • Precordial pain   • Abnormal cardiovascular stress test   • Chronic coronary artery disease   • Supraventricular tachycardia   • Bradycardia   • Coronary artery disease involving native coronary artery of native heart without angina pectoris   • Pacemaker        Past Medical History:   Diagnosis Date   • Arrhythmia    • Benign essential hypertension    • Broken bones    • Cancer     skin cancer   • Chronic diastolic CHF (congestive heart failure)    • COPD (chronic obstructive pulmonary disease)    • Depression    • Fatigue    • Glaucoma    • Headache    • Hyperlipidemia    • Osteoporosis    • Palpitations    • Pneumonia    • Premature atrial complex    • Premature ventricular contractions    • Shortness of breath    • Sinus bradycardia    • Vasovagal syncope    • Weak pulse         Past Surgical History:   Procedure Laterality Date   • BILATERAL BREAST REDUCTION     • CARDIAC CATHETERIZATION      OUTCOME: SUCCESSFUL   • CARDIAC CATHETERIZATION N/A 3/25/2016    Procedure: Left Heart Cath;  Surgeon: Phoebe Bowles MD;  Location: [facility] INVASIVE LOCATION;  Service:    • CARDIAC CATHETERIZATION N/A 3/25/2016    Procedure: Left ventriculography;  Surgeon: Phoebe Bowles MD;   Location:  [facility] CATH INVASIVE LOCATION;  Service:    • CARDIAC CATHETERIZATION N/A 3/25/2016    Procedure: Coronary angiography;  Surgeon: Phoebe Bowles MD;  Location: [facility] CATH INVASIVE LOCATION;  Service:    • CARDIAC ELECTROPHYSIOLOGY PROCEDURE N/A 2/16/2017    Procedure: Pacemaker DC new  [place];  Surgeon: Phoebe Bowles MD;  Location: [facility] CATH INVASIVE LOCATION;  Service:    • CERVICAL FUSION     • ECTOPIC PREGNANCY     • ELBOW DEBRIDEMENT     • EYE SURGERY     • HEMORRHOIDECTOMY     • HYSTERECTOMY     • INSERT / REPLACE / REMOVE PACEMAKER     • LUMBAR PUNCTURE      large volume lumbar puncture 35ml   • NECK SURGERY     • OOPHORECTOMY      FOR ECTOPIC PREGNANCY   • SPINAL FUSION  2012    C3-C4 Fusion Dr. Millard                             PT Assessment/Plan     Row Name 05/21/18 2439          PT Assessment    Assessment Comments Pt doing well with hip strengthening activiites with 0/10 pain today. Continued gait training with cane with dec balance noted with cone weaving requiring quick turns with limited distance between cones. One episode of LOB duirng activity requiring min A to correct. Pt has not used cane at home, continued discussion to encourage.  -LS        PT Plan    PT Plan Comments Continue hip strengthening/stabilization. Consider standing hip extension, tband row with SLS + toe touch.  -LS       User Key  (r) = Recorded By, (t) = Taken By, (c) = Cosigned By    Initials Name Provider Type    LS Anna Shanks PT Physical Therapist                    Exercises     Row Name 05/21/18 1700             Subjective Comments    Subjective Comments I have no pain today.  -LS         Subjective Pain    Able to rate subjective pain? yes  -LS      Pre-Treatment Pain Level 0  -LS         Exercise 1    Exercise Name 1 modified piriformis stretch  -LS      Cueing 1 Verbal  -LS      Reps 1 3  -LS      Time 1 20   -LS         Exercise 2    Exercise Name 2 HL hip adduction  -LS      Sets 2 2   "-LS      Reps 2 10  -LS      Time 2 5  -LS         Exercise 3    Exercise Name 3 HL hip abduction  -LS      Sets 3 2  -LS      Reps 3 10  -LS      Time 3 5 seconds  -LS      Additional Comments GTB  -LS         Exercise 4    Exercise Name 4 LTR  -LS      Sets 4 2  -LS      Reps 4 10  -LS      Time 4 5 sec  -LS         Exercise 5    Exercise Name 5 Nustep L4 UE/LE  -LS      Time 5 5 min  -LS         Exercise 6    Exercise Name 6 Supine TA with marches  -LS      Cueing 6 Verbal  -LS      Sets 6 2  -LS      Reps 6 10  -LS      Additional Comments slow, controlled  -LS         Exercise 7    Exercise Name 7 DKTC  -LS      Cueing 7 Verbal  -LS      Sets 7 2  -LS      Reps 7 10  -LS      Additional Comments with swiss ball  -LS         Exercise 9    Exercise Name 9 90/90 HS stretch  -LS      Cueing 9 Demo;Verbal  -LS      Reps 9 3  -LS      Time 9 20 sec  -LS         Exercise 10    Exercise Name 10 Stair training with SC and single rail  -LS      Cueing 10 Demo  -LS      Reps 10 4 6\" steps x 2  -LS         Exercise 11    Exercise Name 11 Bridge  -LS      Cueing 11 Demo  -LS      Sets 11 2  -LS      Reps 11 10  -LS      Time 11 3 seconds  -LS      Additional Comments 1 set on mat; one on green ball  -LS         Exercise 13    Exercise Name 13 ballet bars: high knee marches, sidestepping, retro, tandem  -LS      Cueing 13 Demo  -LS      Reps 13 4 laps  -LS      Additional Comments CGA  -LS         Exercise 14    Exercise Name 14 Cone weaving with SPC  -LS      Cueing 14 Demo  -LS      Reps 14 3  -LS      Additional Comments step taps each LE  -LS        User Key  (r) = Recorded By, (t) = Taken By, (c) = Cosigned By    Initials Name Provider Type    LANEY Shanks, PT Physical Therapist                               PT OP Goals     Row Name 05/21/18 1800          PT Short Term Goals    STG Date to Achieve 05/14/18  -LS     STG 1 Pt will demonstrate understanding and compliance with initial HEP.  -LS     STG 1 Progress Met  " -LS     STG 1 Progress Comments Pt doing well with compliance.  -LS     STG 2 Pt will report use of ice and appropriate activity modifications to allow L hip healing.   -LS     STG 2 Progress Ongoing  -LS     STG 2 Progress Comments Pt reports 0/10 pain, no need for ice.  -LS     STG 3 Pt will tolerate 45 minute flexibility and stabilization exercises without inc L hip pain.  -LS     STG 3 Progress Met  -LS     STG 3 Progress Comments Pt able to tolerate without pain.  -LS     STG 4 .  -LS        Long Term Goals    LTG Date to Achieve 05/30/18  -LS     LTG 1 Pt will report improved tolerance to walking from 10 minutes to 30 minutes.  -LS     LTG 1 Progress Ongoing  -LS     LTG 1 Progress Comments Discussed short distance walking at home with awareness of distance from her home.  -LS     LTG 2 Pt will report reduced L hip pain at worse from 6/10 to 3/10 or better.  -LS     LTG 2 Progress Progressing  -LS     LTG 2 Progress Comments 0/10 for last 2 days.  -LS     LTG 3 Pt will demonstrate reduced overall disability from 65% disability to 50% or less.  -LS     LTG 3 Progress Ongoing  -LS       User Key  (r) = Recorded By, (t) = Taken By, (c) = Cosigned By    Initials Name Provider Type    LS Anna Shanks PT Physical Therapist          Therapy Education  Given: Symptoms/condition management  Program: Reinforced  How Provided: Verbal, Demonstration  Provided to: Patient  Level of Understanding: Teach back education performed, Demonstrated              Time Calculation:   Start Time: 1715  Stop Time: 1800  Time Calculation (min): 45 min  Total Timed Code Minutes- PT: 43 minute(s)    Therapy Charges for Today     Code Description Service Date Service Provider Modifiers Qty    [id number]  PT THER PROC EA 15 MIN 5/21/2018 Anna Shanks PT GP 3                    Anna Shanks PT  5/21/2018

## 2018-05-23 ENCOUNTER — HOSPITAL ENCOUNTER (OUTPATIENT)
Dept: PHYSICAL THERAPY | Facility: HOSPITAL | Age: 83
Setting detail: THERAPIES SERIES
Discharge: HOME | End: 2018-05-23

## 2018-05-23 DIAGNOSIS — M25.552 LEFT HIP PAIN: Primary | ICD-10-CM

## 2018-05-23 PROCEDURE — 97112 NEUROMUSCULAR REEDUCATION: CPT

## 2018-05-23 PROCEDURE — 97110 THERAPEUTIC EXERCISES: CPT

## 2018-05-23 NOTE — THERAPY TREATMENT NOTE
Outpatient Physical Therapy Ortho Treatment Note   [facility]     Patient Name: Trudy Castano  : 10/12/1932  MRN: [id number]  Today's Date: 2018      Visit Date: 2018    Visit Dx:    ICD-10-CM ICD-9-CM   1. Left hip pain M25.552 719.45       Patient Active Problem List   Diagnosis   • Echocardiogram abnormal   • Abnormal electrocardiogram   • Depression   • Diabetes mellitus   • Fatigue   • Glaucoma   • Hyperlipidemia   • Hypertension   • Osteoporosis   • Palpitations   • Premature atrial contraction   • Ventricular premature beats   • Sinus bradycardia   • Shortness of breath   • Vasovagal syncope   • Weak pulse   • Communicating hydrocephalus   • Intermittent asthma   • Oropharyngeal dysphagia   • Precordial pain   • Abnormal cardiovascular stress test   • Chronic coronary artery disease   • Supraventricular tachycardia   • Bradycardia   • Coronary artery disease involving native coronary artery of native heart without angina pectoris   • Pacemaker        Past Medical History:   Diagnosis Date   • Arrhythmia    • Benign essential hypertension    • Broken bones    • Cancer     skin cancer   • Chronic diastolic CHF (congestive heart failure)    • COPD (chronic obstructive pulmonary disease)    • Depression    • Fatigue    • Glaucoma    • Headache    • Hyperlipidemia    • Osteoporosis    • Palpitations    • Pneumonia    • Premature atrial complex    • Premature ventricular contractions    • Shortness of breath    • Sinus bradycardia    • Vasovagal syncope    • Weak pulse         Past Surgical History:   Procedure Laterality Date   • BILATERAL BREAST REDUCTION     • CARDIAC CATHETERIZATION      OUTCOME: SUCCESSFUL   • CARDIAC CATHETERIZATION N/A 3/25/2016    Procedure: Left Heart Cath;  Surgeon: Phoebe Bowles MD;  Location: [facility] INVASIVE LOCATION;  Service:    • CARDIAC CATHETERIZATION N/A 3/25/2016    Procedure: Left ventriculography;  Surgeon: Phoebe Bowles MD;   Location:  [facility] CATH INVASIVE LOCATION;  Service:    • CARDIAC CATHETERIZATION N/A 3/25/2016    Procedure: Coronary angiography;  Surgeon: Phoebe Bowles MD;  Location[facility] CATH INVASIVE LOC[facility];  Service:    • CARDIAC ELECTROPHYSIOLOGY PROCEDURE N/A 2/16/2017    Procedure: Pacemaker DC new  [place];  Surgeon: Phoebe Bowles MD;  Location: [facility] CATH INVASIVE LOCATION;  Service:    • CERVICAL FUSION     • ECTOPIC PREGNANCY     • ELBOW DEBRIDEMENT     • EYE SURGERY     • HEMORRHOIDECTOMY     • HYSTERECTOMY     • INSERT / REPLACE / REMOVE PACEMAKER     • LUMBAR PUNCTURE      large volume lumbar puncture 35ml   • NECK SURGERY     • OOPHORECTOMY      FOR ECTOPIC PREGNANCY   • SPINAL FUSION  2012    C3-C4 Fusion Dr. Millard                             PT Assessment/Plan     Row Name 05/23/18 6106          PT Assessment    Assessment Comments Pt reporting dec L hip pain but inc R hip pain with hx of LBP. Added standing stabilization activities on compliant surfaces to challenge balance and hip stability. Pt tends to extend lumbar spine in standing with anterior pelvic tilt and lack of TA activation. She has difficulty engaging core in standing. Encouraged HEP performance B to reduce R hip pain.   -LS        PT Plan    PT Plan Comments Continue L hip stabilization/strengthening as well as gait training/balance activities to reduce fall risk.   -LS       User Key  (r) = Recorded By, (t) = Taken By, (c) = Cosigned By    Initials Name Provider Type    LS Anna Shanks PT Physical Therapist                    Exercises     Row Name 05/23/18 1600             Subjective Comments    Subjective Comments My L hip is doing fine. My R hip is sore now.  -LS         Subjective Pain    Able to rate subjective pain? yes  -LS      Pre-Treatment Pain Level 0  -LS         Exercise 1    Exercise Name 1 modified piriformis stretch  -LS      Cueing 1 Verbal  -LS      Reps 1 3  -LS      Time 1 20   -LS         Exercise 2     Exercise Name 2 HL hip adduction  -LS      Sets 2 2  -LS      Reps 2 10  -LS      Time 2 5  -LS         Exercise 3    Exercise Name 3 HL hip abduction  -LS      Sets 3 2  -LS      Reps 3 10  -LS      Time 3 5 seconds  -LS      Additional Comments GTB  -LS         Exercise 4    Exercise Name 4 LTR  -LS      Sets 4 2  -LS      Reps 4 10  -LS      Time 4 5 sec  -LS      Additional Comments on blue ball  -LS         Exercise 5    Exercise Name 5 Nustep L4 UE/LE  -LS      Time 5 5 min  -LS         Exercise 6    Exercise Name 6 Supine TA with marches  -LS      Cueing 6 Verbal  -LS      Sets 6 2  -LS      Reps 6 10  -LS      Additional Comments slow, controlled  -LS         Exercise 7    Exercise Name 7 DKTC  -LS      Cueing 7 Verbal  -LS      Sets 7 2  -LS      Reps 7 10  -LS      Additional Comments with swiss ball  -LS         Exercise 8    Exercise Name 8 standing row on blue foam  -LS      Sets 8 2  -LS      Reps 8 10  -LS      Additional Comments RTB; cuing for PPT to reduce lumbar extension  -LS         Exercise 9    Exercise Name 9 90/90 HS stretch  -LS      Cueing 9 Demo;Verbal  -LS      Reps 9 3  -LS      Time 9 20 sec  -LS         Exercise 10    Exercise Name 10 --  -LS      Cueing 10 --  -LS      Reps 10 --  -LS         Exercise 11    Exercise Name 11 Bridge  -LS      Cueing 11 Demo  -LS      Sets 11 2  -LS      Reps 11 10  -LS      Time 11 3 seconds  -LS         Exercise 12    Exercise Name 12 narrow PATY EO/EC on blue foam  -LS      Reps 12 2  -LS      Time 12 30 seconds  -LS         Exercise 13    Exercise Name 13 ballet bars: high knee marches, sidestepping, retro, tandem  -LS      Cueing 13 Demo  -LS      Reps 13 4 laps  -LS      Additional Comments CGA  -LS         Exercise 14    Exercise Name 14 --  -LS      Cueing 14 --  -LS      Reps 14 --  -LS        User Key  (r) = Recorded By, (t) = Taken By, (c) = Cosigned By    Initials Name Provider Type    LANEY Shanks PT Physical Therapist                                PT OP Goals     Row Name 05/23/18 1700          PT Short Term Goals    STG Date to Achieve 05/14/18  -LS     STG 1 Pt will demonstrate understanding and compliance with initial HEP.  -LS     STG 1 Progress Met  -LS     STG 2 Pt will report use of ice and appropriate activity modifications to allow L hip healing.   -LS     STG 2 Progress Ongoing  -LS     STG 3 Pt will tolerate 45 minute flexibility and stabilization exercises without inc L hip pain.  -LS     STG 3 Progress Met  -LS     STG 4 .  -LS        Long Term Goals    LTG Date to Achieve 05/30/18  -LS     LTG 1 Pt will report improved tolerance to walking from 10 minutes to 30 minutes.  -LS     LTG 1 Progress Ongoing  -LS     LTG 2 Pt will report reduced L hip pain at worse from 6/10 to 3/10 or better.  -LS     LTG 2 Progress Progressing  -LS     LTG 3 Pt will demonstrate reduced overall disability from 65% disability to 50% or less.  -LS     LTG 3 Progress Ongoing  -LS       User Key  (r) = Recorded By, (t) = Taken By, (c) = Cosigned By    Initials Name Provider Type    LS Anna Shanks PT Physical Therapist                         Time Calculation:   Start Time: 1645  Stop Time: 1730  Time Calculation (min): 45 min  Total Timed Code Minutes- PT: 42 minute(s)    Therapy Charges for Today     Code Description Service Date Service Provider Modifiers Qty    86959009498 HC PT THER PROC EA 15 MIN 5/23/2018 Anna Shanks PT GP 2    80064664319 HC PT NEUROMUSC RE EDUCATION EA 15 MIN 5/23/2018 Anna Shanks PT GP 1                    Anna Shanks PT  5/23/2018

## 2018-05-30 ENCOUNTER — APPOINTMENT (OUTPATIENT)
Dept: PHYSICAL THERAPY | Facility: HOSPITAL | Age: 83
End: 2018-05-30

## 2018-06-04 ENCOUNTER — HOSPITAL ENCOUNTER (OUTPATIENT)
Dept: PHYSICAL THERAPY | Facility: HOSPITAL | Age: 83
Setting detail: THERAPIES SERIES
Discharge: HOME | End: 2018-06-04

## 2018-06-04 DIAGNOSIS — M25.552 LEFT HIP PAIN: Primary | ICD-10-CM

## 2018-06-04 PROCEDURE — 97112 NEUROMUSCULAR REEDUCATION: CPT | Performed by: PHYSICAL THERAPIST

## 2018-06-04 PROCEDURE — 97110 THERAPEUTIC EXERCISES: CPT | Performed by: PHYSICAL THERAPIST

## 2018-06-04 NOTE — THERAPY TREATMENT NOTE
Outpatient Physical Therapy Ortho Treatment Note  [facility]     Patient Name: Trudy Castano  : 10/12/1932  MRN: [id number]  Today's Date: 2018      Visit Date: 2018    Visit Dx:    ICD-10-CM ICD-9-CM   1. Left hip pain M25.552 719.45       Patient Active Problem List   Diagnosis   • Echocardiogram abnormal   • Abnormal electrocardiogram   • Depression   • Diabetes mellitus   • Fatigue   • Glaucoma   • Hyperlipidemia   • Hypertension   • Osteoporosis   • Palpitations   • Premature atrial contraction   • Ventricular premature beats   • Sinus bradycardia   • Shortness of breath   • Vasovagal syncope   • Weak pulse   • Communicating hydrocephalus   • Intermittent asthma   • Oropharyngeal dysphagia   • Precordial pain   • Abnormal cardiovascular stress test   • Chronic coronary artery disease   • Supraventricular tachycardia   • Bradycardia   • Coronary artery disease involving native coronary artery of native heart without angina pectoris   • Pacemaker        Past Medical History:   Diagnosis Date   • Arrhythmia    • Benign essential hypertension    • Broken bones    • Cancer     skin cancer   • Chronic diastolic CHF (congestive heart failure)    • COPD (chronic obstructive pulmonary disease)    • Depression    • Fatigue    • Glaucoma    • Headache    • Hyperlipidemia    • Osteoporosis    • Palpitations    • Pneumonia    • Premature atrial complex    • Premature ventricular contractions    • Shortness of breath    • Sinus bradycardia    • Vasovagal syncope    • Weak pulse         Past Surgical History:   Procedure Laterality Date   • BILATERAL BREAST REDUCTION     • CARDIAC CATHETERIZATION      OUTCOME: SUCCESSFUL   • CARDIAC CATHETERIZATION N/A 3/25/2016    Procedure: Left Heart Cath;  Surgeon: Phoebe Bowles MD;  Location: [facility] INVASIVE LOCATION;  Service:    • CARDIAC CATHETERIZATION N/A 3/25/2016    Procedure: Left ventriculography;  Surgeon: Phoebe Bowles MD;  Location:   [facility] CATH INVASIVE LOCATION;  Service:    • CARDIAC CATHETERIZATION N/A 3/25/2016    Procedure: Coronary angiography;  Surgeon: Phoebe Bowles MD;  Location: [facility] CATH INVASIVE LOCATION;  Service:    • CARDIAC ELECTROPHYSIOLOGY PROCEDURE N/A 2/16/2017    Procedure: Pacemaker DC new  [place];  Surgeon: Phoebe Bowles MD;  Location: [facility] CATH INVASIVE LOCATION;  Service:    • CERVICAL FUSION     • ECTOPIC PREGNANCY     • ELBOW DEBRIDEMENT     • EYE SURGERY     • HEMORRHOIDECTOMY     • HYSTERECTOMY     • INSERT / REPLACE / REMOVE PACEMAKER     • LUMBAR PUNCTURE      large volume lumbar puncture 35ml   • NECK SURGERY     • OOPHORECTOMY      FOR ECTOPIC PREGNANCY   • SPINAL FUSION  2012    C3-C4 Fusion Dr. Millard                             PT Assessment/Plan     Row Name 06/04/18 1705          PT Plan    PT Plan Comments continue left hip stabilization and strengthening as well as gait training/balance activities to reduce fall risk. add and progress glut medi/min strengthening.   -SC       User Key  (r) = Recorded By, (t) = Taken By, (c) = Cosigned By    Initials Name Provider Type    SC Khushi Ramirez, PT Physical Therapist                    Exercises     Row Name 06/04/18 7282             Subjective Comments    Subjective Comments Doing better. One day this weekend it was sore right in the groin but I babied it a little and it's fine now. I am just taking that medication the doctor gave me once a day in the morning. When I wake up it is sharp pain but I take that pill one time and it is all better. No issues sleeping. Overall it is doing much better.   -SC         Subjective Pain    Able to rate subjective pain? yes  -SC      Pre-Treatment Pain Level 0  -SC         Exercise 1    Exercise Name 1 modified piriformis stretch  -SC      Cueing 1 Verbal  -SC      Reps 1 3  -SC      Time 1 20   -SC         Exercise 2    Exercise Name 2 HL hip adduction  -SC      Sets 2 2  -SC      Reps 2 10  -SC       Time 2 5  -SC         Exercise 3    Exercise Name 3 HL hip abduction  -SC      Sets 3 2  -SC      Reps 3 10  -SC      Time 3 5 seconds  -SC         Exercise 4    Exercise Name 4 LTR  -SC      Sets 4 2  -SC      Reps 4 10  -SC      Time 4 5 sec  -SC         Exercise 5    Exercise Name 5 Nustep L4 UE/LE  -SC      Time 5 5 min  -SC      Additional Comments L4  -SC         Exercise 6    Exercise Name 6 Supine TA with marches  -SC      Cueing 6 Verbal  -SC      Sets 6 2  -SC      Reps 6 10  -SC         Exercise 7    Exercise Name 7 DKTC  -SC      Cueing 7 Verbal  -SC      Sets 7 2  -SC      Reps 7 10  -SC         Exercise 8    Exercise Name 8 standing row on blue foam  -SC      Sets 8 2  -SC      Reps 8 10  -SC         Exercise 9    Exercise Name 9 90/90 HS stretch  -SC      Cueing 9 Demo;Verbal  -SC      Reps 9 3  -SC      Time 9 20 sec  -SC         Exercise 10    Exercise Name 10 clams  -SC      Reps 10 15  -SC      Additional Comments R  -SC         Exercise 11    Exercise Name 11 Bridge  -SC      Cueing 11 Demo  -SC      Sets 11 2  -SC      Reps 11 10  -SC      Time 11 3 seconds  -SC         Exercise 12    Exercise Name 12 narrow PATY EO/EC on blue foam  -SC      Reps 12 2  -SC      Time 12 30 seconds  -SC         Exercise 13    Exercise Name 13 ballet bars: high knee marches, sidestepping, retro, tandem  -SC      Cueing 13 Demo  -SC      Reps 13 4 laps  -SC         Exercise 14    Exercise Name 14 SL hip ER/abd  -SC      Reps 14 10  -SC      Additional Comments R  -SC         Exercise 15    Exercise Name 15 SL hip abd  -SC      Reps 15 10  -SC      Additional Comments R Alison  -SC        User Key  (r) = Recorded By, (t) = Taken By, (c) = Cosigned By    Initials Name Provider Type    SC Khushi Ramirez, PT Physical Therapist                             Therapy Education  Given: Symptoms/condition management, Pain management  Program: Reinforced  How Provided: Verbal, Demonstration  Provided to: Patient  Level of  Understanding: Teach back education performed, Demonstrated              Time Calculation:   Start Time: 1702  Stop Time: 1740  Time Calculation (min): 38 min  Total Timed Code Minutes- PT: 38 minute(s)    Therapy Charges for Today     Code Description Service Date Service Provider Modifiers Qty    41124851849 HC PT THER PROC EA 15 MIN 6/4/2018 Khushi Ramirez, PT GP 1    04619747254 HC PT NEUROMUSC RE EDUCATION EA 15 MIN 6/4/2018 Khushi Ramirez, PT GP 1    57966898027 HC PT THER PROC EA 15 MIN 6/4/2018 Khushi Ramirez, PT GP 1                    Khushi Harmon, PT  6/4/2018

## 2018-06-07 ENCOUNTER — HOSPITAL ENCOUNTER (OUTPATIENT)
Dept: PHYSICAL THERAPY | Facility: HOSPITAL | Age: 83
Setting detail: THERAPIES SERIES
Discharge: HOME | End: 2018-06-07

## 2018-06-07 DIAGNOSIS — M25.552 LEFT HIP PAIN: Primary | ICD-10-CM

## 2018-06-07 PROCEDURE — G8979 MOBILITY GOAL STATUS: HCPCS

## 2018-06-07 PROCEDURE — 97140 MANUAL THERAPY 1/> REGIONS: CPT

## 2018-06-07 PROCEDURE — 97110 THERAPEUTIC EXERCISES: CPT

## 2018-06-07 PROCEDURE — G8978 MOBILITY CURRENT STATUS: HCPCS

## 2018-06-07 NOTE — THERAPY PROGRESS REPORT/RE-CERT
Outpatient Physical Therapy Ortho Re-Assessment  [facility]     Patient Name: Trudy Castano  : 10/12/1932  MRN: [id number]  Today's Date: 2018      Visit Date: 2018    Patient Active Problem List   Diagnosis   • Echocardiogram abnormal   • Abnormal electrocardiogram   • Depression   • Diabetes mellitus   • Fatigue   • Glaucoma   • Hyperlipidemia   • Hypertension   • Osteoporosis   • Palpitations   • Premature atrial contraction   • Ventricular premature beats   • Sinus bradycardia   • Shortness of breath   • Vasovagal syncope   • Weak pulse   • Communicating hydrocephalus   • Intermittent asthma   • Oropharyngeal dysphagia   • Precordial pain   • Abnormal cardiovascular stress test   • Chronic coronary artery disease   • Supraventricular tachycardia   • Bradycardia   • Coronary artery disease involving native coronary artery of native heart without angina pectoris   • Pacemaker        Past Medical History:   Diagnosis Date   • Arrhythmia    • Benign essential hypertension    • Broken bones    • Cancer     skin cancer   • Chronic diastolic CHF (congestive heart failure)    • COPD (chronic obstructive pulmonary disease)    • Depression    • Fatigue    • Glaucoma    • Headache    • Hyperlipidemia    • Osteoporosis    • Palpitations    • Pneumonia    • Premature atrial complex    • Premature ventricular contractions    • Shortness of breath    • Sinus bradycardia    • Vasovagal syncope    • Weak pulse         Past Surgical History:   Procedure Laterality Date   • BILATERAL BREAST REDUCTION     • CARDIAC CATHETERIZATION      OUTCOME: SUCCESSFUL   • CARDIAC CATHETERIZATION N/A 3/25/2016    Procedure: Left Heart Cath;  Surgeon: Phoebe Bowles MD;  Location: [facility] INVASIVE LOCATION;  Service:    • CARDIAC CATHETERIZATION N/A 3/25/2016    Procedure: Left ventriculography;  Surgeon: Phoebe Bowles MD;  Location: [facility] INVASIVE LOCATION;  Service:    • CARDIAC CATHETERIZATION N/A  3/25/2016    Procedure: Coronary angiography;  Surgeon: Phoebe Bowles MD;  Location:  [facility] CATH INVASIVE LOCATION;  Service:    • CARDIAC ELECTROPHYSIOLOGY PROCEDURE N/A 2/16/2017    Procedure: Pacemaker DC new  [place];  Surgeon: Phoebe Bowles MD;  Location:  [facility] CATH INVASIVE LOCATION;  Service:    • CERVICAL FUSION     • ECTOPIC PREGNANCY     • ELBOW DEBRIDEMENT     • EYE SURGERY     • HEMORRHOIDECTOMY     • HYSTERECTOMY     • INSERT / REPLACE / REMOVE PACEMAKER     • LUMBAR PUNCTURE      large volume lumbar puncture 35ml   • NECK SURGERY     • OOPHORECTOMY      FOR ECTOPIC PREGNANCY   • SPINAL FUSION  2012    C3-C4 Fusion Dr. Millard       Visit Dx:     ICD-10-CM ICD-9-CM   1. Left hip pain M25.552 719.45                           Therapy Education  Given: Symptoms/condition management, Pain management  Program: Reinforced  How Provided: Verbal, Demonstration  Provided to: Patient  Level of Understanding: Teach back education performed, Demonstrated           PT OP Goals     Row Name 06/07/18 1600          PT Short Term Goals    STG Date to Achieve 05/14/18  -CN     STG 1 Pt will demonstrate understanding and compliance with initial HEP.  -CN     STG 1 Progress Met  -CN     STG 2 Pt will report use of ice and appropriate activity modifications to allow L hip healing.   -CN     STG 2 Progress Met  -CN     STG 3 Pt will tolerate 45 minute flexibility and stabilization exercises without inc L hip pain.  -CN     STG 3 Progress Met  -CN        Long Term Goals    LTG Date to Achieve 05/30/18  -CN     LTG 1 Pt will report improved tolerance to walking from 10 minutes to 30 minutes.  -CN     LTG 1 Progress Ongoing  -CN     LTG 1 Progress Comments Pt attempting to increase ambulation time by walking dog, estimates walking time 10-15 min at this time.   -CN     LTG 2 Pt will report reduced L hip pain at worse from 6/10 to 3/10 or better.  -CN     LTG 2 Progress Progressing  -CN     LTG 2 Progress  Comments Pt reports pain rated 5/10 at worst when coming to stand after sitting for extended period of time.   -CN     LTG 3 Pt will demonstrate reduced overall disability from 65% disability to 50% or less.  -CN     LTG 3 Progress Met  -CN     LTG 3 Progress Comments Pt scored 64% on the LEFS where 100% is no disability.   -CN       User Key  (r) = Recorded By, (t) = Taken By, (c) = Cosigned By    Initials Name Provider Type    KRISTINA Schaeffer, PT Physical Therapist                PT Assessment/Plan     Row Name 06/07/18 1564          PT Assessment    Functional Limitations Limitation in home management;Performance in leisure activities;Limitations in community activities;Impaired gait;Impaired locomotion  -CN     Impairments Joint mobility;Pain;Range of motion;Posture;Joint integrity;Gait;Impaired flexibility;Muscle strength  -CN     Assessment Comments Pt has attended 8 skilled therapy sessions for treatment of L hip pain s/p fall in home. Pt reports improved symptoms overall since evaluation, however greatest complaint is pain with coming up to stand after sitting long period of time. Pt states that pain dissipates after ambulating 10-15 seconds. Pt also reports pain at night if sidesleeping and taking medication in am which decreases symptoms. Pt able to ascend/descend stairs with B UEs and no pain in order to complete laundry tasks at this time. Pt continues with dec hip extension with gait due to mild weakness in hip tissues as well as balance disturbances with challenging exercises which increase her risk for future falls. Pt would benefit from continued PT to address remaining deficits and return to PLOF.   -CN     Please refer to paper survey for additional self-reported information Yes  -CN     Rehab Potential Good  -CN     Patient/caregiver participated in establishment of treatment plan and goals Yes  -CN     Patient would benefit from skilled therapy intervention Yes  -CN        PT Plan    PT  Frequency 2x/week  -CN     Predicted Duration of Therapy Intervention (OT Eval) 4 weeks  -CN     Planned CPT's? PT RE-EVAL: 14628;PT THER PROC EA 15 MIN: 42599;PT THER ACT EA 15 MIN: 95861;PT MANUAL THERAPY EA 15 MIN: 10277;PT NEUROMUSC RE-EDUCATION EA 15 MIN: 28300;PT GAIT TRAINING EA 15 MIN: 96347;PT HOT OR COLD PACK TREAT MCARE  -CN     PT Plan Comments PT to continue with stability and hip/core strengthening exercises in order to reduce pain upon standing/initiating gait.   -CN       User Key  (r) = Recorded By, (t) = Taken By, (c) = Cosigned By    Initials Name Provider Type    CN Latoya Schaeffer, PT Physical Therapist                  Exercises     Row Name 06/07/18 1600             Subjective Comments    Subjective Comments My left hip has been hurting since last time. I think it might have been the new exercise we added.   -CN         Subjective Pain    Able to rate subjective pain? yes  -CN      Pre-Treatment Pain Level 4  -CN      Post-Treatment Pain Level 0  -CN         Exercise 1    Exercise Name 1 modified piriformis stretch  -CN      Cueing 1 Verbal  -CN      Reps 1 3  -CN      Time 1 20   -CN         Exercise 2    Exercise Name 2 HL hip adduction  -CN      Sets 2 2  -CN      Reps 2 10  -CN      Time 2 5  -CN         Exercise 3    Exercise Name 3 HL hip abduction  -CN      Sets 3 2  -CN      Reps 3 10  -CN      Time 3 5 seconds  -CN      Additional Comments GTB  -CN         Exercise 4    Exercise Name 4 LTR  -CN      Sets 4 2  -CN      Reps 4 10  -CN      Time 4 5 sec  -CN         Exercise 5    Exercise Name 5 Nustep L4 UE/LE  -CN      Time 5 5 min  -CN         Exercise 6    Exercise Name 6 Supine TA with marches  -CN      Cueing 6 Verbal  -CN      Sets 6 2  -CN      Reps 6 10  -CN         Exercise 7    Exercise Name 7 DKTC  -CN      Cueing 7 Verbal  -CN      Sets 7 2  -CN      Reps 7 10  -CN      Additional Comments with swiss ball  -CN         Exercise 8    Exercise Name 8 standing row on blue  foam  -CN      Sets 8 2  -CN      Reps 8 10  -CN      Additional Comments RTB, cueing for erect position vs backward lean  -CN         Exercise 9    Exercise Name 9 90/90 HS stretch  -CN      Cueing 9 Demo;Verbal  -CN      Reps 9 3  -CN      Time 9 20 sec  -CN         Exercise 11    Exercise Name 11 Bridge with feet on blue foam  -CN      Cueing 11 Demo  -CN      Sets 11 2  -CN      Reps 11 10  -CN      Time 11 3 seconds  -CN        User Key  (r) = Recorded By, (t) = Taken By, (c) = Cosigned By    Initials Name Provider Type    KRISTINA Schaeffer, PT Physical Therapist           Manual Rx (last 36 hours)      Manual Treatments     Row Name 06/07/18 1500             Manual Rx 1    Manual Rx 1 Location gentle stm to L piriformis/glute med, pt in R SLing with pillow between knees  -CN      Manual Rx 1 Duration 8 min  -CN        User Key  (r) = Recorded By, (t) = Taken By, (c) = Cosigned By    Initials Name Provider Type    KRISTINA Schaeffer PT Physical Therapist                      Outcome Measure Options: Lower Extremity Functional Scale (LEFS)         Time Calculation:   Start Time: 1645  Stop Time: 1730  Time Calculation (min): 45 min  Total Timed Code Minutes- PT: 45 minute(s)     Therapy Charges for Today     Code Description Service Date Service Provider Modifiers Qty    44105626602 HC PT THER PROC EA 15 MIN 6/7/2018 Latoya Schaeffer, PT GP 2    12805917258 HC PT MANUAL THERAPY EA 15 MIN 6/7/2018 Latoya Schaeffer PT GP 1    06402613541 HC PT MOBILITY CURRENT 6/7/2018 Latoya Schaeffer PT GP, CJ 1    [id number] HC PT MOBILITY PROJECTED 6/7/2018 Latoya Schaeffer PT GP, CK 1          PT G-Codes  PT Professional Judgement Used?: Yes  Outcome Measure Options: Lower Extremity Functional Scale (LEFS)  Score: 64% where 100% is no disability (36% disability)  Functional Limitation: Mobility: Walking and moving around  Mobility: Walking and Moving Around Current Status ():  At least 20 percent but less than 40 percent impaired, limited or restricted  Mobility: Walking and Moving Around Goal Status (): At least 40 percent but less than 60 percent impaired, limited or restricted         Latoya Schaeffer, PT  6/7/2018

## 2018-06-11 ENCOUNTER — HOSPITAL ENCOUNTER (OUTPATIENT)
Dept: PHYSICAL THERAPY | Facility: HOSPITAL | Age: 83
Setting detail: THERAPIES SERIES
Discharge: HOME | End: 2018-06-11

## 2018-06-11 DIAGNOSIS — M25.552 LEFT HIP PAIN: Primary | ICD-10-CM

## 2018-06-11 PROCEDURE — 97110 THERAPEUTIC EXERCISES: CPT

## 2018-06-11 NOTE — THERAPY TREATMENT NOTE
Outpatient Physical Therapy Ortho Treatment Note  [facility]     Patient Name: Trudy Castano  : 10/12/1932  MRN: [id number]  Today's Date: 2018      Visit Date: 2018    Visit Dx:    ICD-10-CM ICD-9-CM   1. Left hip pain M25.552 719.45       Patient Active Problem List   Diagnosis   • Echocardiogram abnormal   • Abnormal electrocardiogram   • Depression   • Diabetes mellitus   • Fatigue   • Glaucoma   • Hyperlipidemia   • Hypertension   • Osteoporosis   • Palpitations   • Premature atrial contraction   • Ventricular premature beats   • Sinus bradycardia   • Shortness of breath   • Vasovagal syncope   • Weak pulse   • Communicating hydrocephalus   • Intermittent asthma   • Oropharyngeal dysphagia   • Precordial pain   • Abnormal cardiovascular stress test   • Chronic coronary artery disease   • Supraventricular tachycardia   • Bradycardia   • Coronary artery disease involving native coronary artery of native heart without angina pectoris   • Pacemaker        Past Medical History:   Diagnosis Date   • Arrhythmia    • Benign essential hypertension    • Broken bones    • Cancer     skin cancer   • Chronic diastolic CHF (congestive heart failure)    • COPD (chronic obstructive pulmonary disease)    • Depression    • Fatigue    • Glaucoma    • Headache    • Hyperlipidemia    • Osteoporosis    • Palpitations    • Pneumonia    • Premature atrial complex    • Premature ventricular contractions    • Shortness of breath    • Sinus bradycardia    • Vasovagal syncope    • Weak pulse         Past Surgical History:   Procedure Laterality Date   • BILATERAL BREAST REDUCTION     • CARDIAC CATHETERIZATION      OUTCOME: SUCCESSFUL   • CARDIAC CATHETERIZATION N/A 3/25/2016    Procedure: Left Heart Cath;  Surgeon: Phoebe Bowles MD;  Location: [facility] INVASIVE LOCATION;  Service:    • CARDIAC CATHETERIZATION N/A 3/25/2016    Procedure: Left ventriculography;  Surgeon: Phoebe Bowles MD;  Location:   [facility] INVASIVE LOCATION;  Service:    • CARDIAC CATHETERIZATION N/A 3/25/2016    Procedure: Coronary angiography;  Surgeon: Phoebe Bowles MD;  Location:  [facility] INVASIVE LOCATION;  Service:    • CARDIAC ELECTROPHYSIOLOGY PROCEDURE N/A 2/16/2017    Procedure: Pacemaker DC new  [place];  Surgeon: Phoebe Bowles MD;  Location:  [facility] INVASIVE LOCATION;  Service:    • CERVICAL FUSION     • ECTOPIC PREGNANCY     • ELBOW DEBRIDEMENT     • EYE SURGERY     • HEMORRHOIDECTOMY     • HYSTERECTOMY     • INSERT / REPLACE / REMOVE PACEMAKER     • LUMBAR PUNCTURE      large volume lumbar puncture 35ml   • NECK SURGERY     • OOPHORECTOMY      FOR ECTOPIC PREGNANCY   • SPINAL FUSION  2012    C3-C4 Fusion Dr. Millard                             PT Assessment/Plan     Row Name 06/11/18 3244          PT Assessment    Assessment Comments Pt L hip pain initially laterally at hip with prolonged standing seems to have resolved. Current pain reports more posterior pain that may be of lumbar origin. Discussed this with patient and encouraged her to assess patterns and causes of current pain. Continued poor standing posture with dec core activation. Noted dec gait speed and RLE swing today with dec R step length. Focused on standing stabilization with isometric glute activation rather than concentric abduction to reduce glute med strain. Pt reported no inc pain following session.   -LS        PT Plan    PT Plan Comments Continue 1x/week for 3 weeks, assess lumbar spine as pain origin, consider trial of lumbar traction, continue hip/core stabilization.   -       User Key  (r) = Recorded By, (t) = Taken By, (c) = Cosigned By    Initials Name Provider Type    LS Anna Shanks, PT Physical Therapist                    Exercises     Row Name 06/11/18 1800             Subjective Comments    Subjective Comments My hip has been hurting me more when I get up in the morning and when I first stand up from a chair.  -LS          Subjective Pain    Able to rate subjective pain? yes  -LS      Pre-Treatment Pain Level 0  -LS      Subjective Pain Comment Right now I don't feel it at all.   -LS         Exercise 1    Exercise Name 1 modified piriformis stretch  -LS      Cueing 1 Verbal  -LS      Reps 1 3  -LS      Time 1 20   -LS         Exercise 4    Exercise Name 4 LTR  -LS      Sets 4 2  -LS      Reps 4 10  -LS      Time 4 5 sec  -LS      Additional Comments on blue ball  -LS         Exercise 5    Exercise Name 5 Nustep L4 UE/LE  -LS      Time 5 5 min  -LS         Exercise 6    Exercise Name 6 Supine TA with marches  -LS      Cueing 6 Verbal  -LS      Sets 6 2  -LS      Reps 6 10  -LS         Exercise 7    Exercise Name 7 DKTC  -LS      Cueing 7 Verbal  -LS      Sets 7 2  -LS      Reps 7 10  -LS      Additional Comments holding with arms  -LS         Exercise 8    Exercise Name 8 standing row on blue foam  -LS      Sets 8 2  -LS      Reps 8 10  -LS      Additional Comments RTB  -LS         Exercise 9    Exercise Name 9 90/90 HS stretch  -LS      Cueing 9 Demo;Verbal  -LS      Reps 9 3  -LS      Time 9 20 sec  -LS         Exercise 11    Exercise Name 11 Bridge with feet on blue foam  -LS      Cueing 11 Demo  -LS      Sets 11 2  -LS      Reps 11 10  -LS      Time 11 3 seconds  -LS         Exercise 13    Exercise Name 13 ballet bars: sidestepping, retro, tandem  -LS      Reps 13 4 laps  -LS         Exercise 16    Exercise Name 16 wall wash  -LS      Reps 16 10  -LS         Exercise 17    Exercise Name 17 standing march with focus on posture at mirror  -LS      Reps 17 20  -LS         Exercise 18    Exercise Name 18 standing hip extension  -LS      Reps 18 10  -LS      Additional Comments B  -LS        User Key  (r) = Recorded By, (t) = Taken By, (c) = Cosigned By    Initials Name Provider Type    LANEY Shanks, PT Physical Therapist                               PT OP Goals     Row Name 06/11/18 1800          PT Short Term Goals    STG  Date to Achieve 05/14/18  -LS     STG 1 Pt will demonstrate understanding and compliance with initial HEP.  -LS     STG 1 Progress Met  -LS     STG 2 Pt will report use of ice and appropriate activity modifications to allow L hip healing.   -LS     STG 2 Progress Met  -LS     STG 3 Pt will tolerate 45 minute flexibility and stabilization exercises without inc L hip pain.  -LS     STG 3 Progress Met  -LS        Long Term Goals    LTG Date to Achieve 05/30/18  -LS     LTG 1 Pt will report improved tolerance to walking from 10 minutes to 30 minutes.  -LS     LTG 1 Progress Ongoing  -LS     LTG 2 Pt will report reduced L hip pain at worse from 6/10 to 3/10 or better.  -LS     LTG 2 Progress Progressing  -LS     LTG 3 Pt will demonstrate reduced overall disability from 65% disability to 50% or less.  -LS     LTG 3 Progress Met  -LS       User Key  (r) = Recorded By, (t) = Taken By, (c) = Cosigned By    Initials Name Provider Type    LS Anna Shanks PT Physical Therapist          Therapy Education  Education Details: discussed standing posture, log roll in and out of bed, cuing for abdominal activation in standing  Given: Symptoms/condition management, Posture/body mechanics  Program: Reinforced  How Provided: Verbal  Provided to: Patient  Level of Understanding: Teach back education performed, Demonstrated, Verbalized              Time Calculation:   Start Time: 1645  Stop Time: 1730  Time Calculation (min): 45 min  Total Timed Code Minutes- PT: 42 minute(s)    Therapy Charges for Today     Code Description Service Date Service Provider Modifiers Qty    85133531802 HC PT THER PROC EA 15 MIN 6/11/2018 Anna Shanks PT GP 3                    Anna Shanks PT  6/11/2018

## 2018-06-14 ENCOUNTER — HOSPITAL ENCOUNTER (OUTPATIENT)
Dept: PHYSICAL THERAPY | Facility: HOSPITAL | Age: 83
Setting detail: THERAPIES SERIES
Discharge: HOME | End: 2018-06-14

## 2018-06-14 DIAGNOSIS — M25.552 LEFT HIP PAIN: Primary | ICD-10-CM

## 2018-06-14 PROCEDURE — 97110 THERAPEUTIC EXERCISES: CPT

## 2018-06-14 NOTE — THERAPY TREATMENT NOTE
Outpatient Physical Therapy Ortho Treatment Note  [facility]     Patient Name: Trudy Castano  : 10/12/1932  MRN: [id number]  Today's Date: 2018      Visit Date: 2018    Visit Dx:    ICD-10-CM ICD-9-CM   1. Left hip pain M25.552 719.45       Patient Active Problem List   Diagnosis   • Echocardiogram abnormal   • Abnormal electrocardiogram   • Depression   • Diabetes mellitus   • Fatigue   • Glaucoma   • Hyperlipidemia   • Hypertension   • Osteoporosis   • Palpitations   • Premature atrial contraction   • Ventricular premature beats   • Sinus bradycardia   • Shortness of breath   • Vasovagal syncope   • Weak pulse   • Communicating hydrocephalus   • Intermittent asthma   • Oropharyngeal dysphagia   • Precordial pain   • Abnormal cardiovascular stress test   • Chronic coronary artery disease   • Supraventricular tachycardia   • Bradycardia   • Coronary artery disease involving native coronary artery of native heart without angina pectoris   • Pacemaker        Past Medical History:   Diagnosis Date   • Arrhythmia    • Benign essential hypertension    • Broken bones    • Cancer     skin cancer   • Chronic diastolic CHF (congestive heart failure)    • COPD (chronic obstructive pulmonary disease)    • Depression    • Fatigue    • Glaucoma    • Headache    • Hyperlipidemia    • Osteoporosis    • Palpitations    • Pneumonia    • Premature atrial complex    • Premature ventricular contractions    • Shortness of breath    • Sinus bradycardia    • Vasovagal syncope    • Weak pulse         Past Surgical History:   Procedure Laterality Date   • BILATERAL BREAST REDUCTION     • CARDIAC CATHETERIZATION      OUTCOME: SUCCESSFUL   • CARDIAC CATHETERIZATION N/A 3/25/2016    Procedure: Left Heart Cath;  Surgeon: Phoebe Bowles MD;  Location: [facility] INVASIVE LOCATION;  Service:    • CARDIAC CATHETERIZATION N/A 3/25/2016    Procedure: Left ventriculography;  Surgeon: Phoebe Bowles MD;  Location:   [facility] INVASIVE LOCATION;  Service:    • CARDIAC CATHETERIZATION N/A 3/25/2016    Procedure: Coronary angiography;  Surgeon: Phoebe Bowles MD;  Location:  [facility] INVASIVE LOCATION;  Service:    • CARDIAC ELECTROPHYSIOLOGY PROCEDURE N/A 2/16/2017    Procedure: Pacemaker DC new  [place];  Surgeon: Phoebe Bowles MD;  Location:  [facility] INVASIVE LOCATION;  Service:    • CERVICAL FUSION     • ECTOPIC PREGNANCY     • ELBOW DEBRIDEMENT     • EYE SURGERY     • HEMORRHOIDECTOMY     • HYSTERECTOMY     • INSERT / REPLACE / REMOVE PACEMAKER     • LUMBAR PUNCTURE      large volume lumbar puncture 35ml   • NECK SURGERY     • OOPHORECTOMY      FOR ECTOPIC PREGNANCY   • SPINAL FUSION  2012    C3-C4 Fusion Dr. Millard                             PT Assessment/Plan     Row Name 06/14/18 1727          PT Assessment    Assessment Comments Pt reports decreased hip pain over past 3 days and noticing improvement with pain in hip upon standing. Continued with glute and core strengthening today and pt requires copious cuieng to maintain TA contraction and correct pelvic alignment during standing exercises. Initiated stagger stance with ball rotation for improved core stability which pt was able to perform with SBA.   -CN        PT Plan    PT Plan Comments Continue 1x/week for 2 weeks with plan to D/C to independent HEP.   -CN       User Key  (r) = Recorded By, (t) = Taken By, (c) = Cosigned By    Initials Name Provider Type    KRISTINA Schaeffer, PT Physical Therapist                    Exercises     Row Name 06/14/18 1600             Subjective Comments    Subjective Comments You know, it has been feeling a lot better over the past 3 days. I think it is trying to get better. It has been less when I come up to stand.   -CN         Subjective Pain    Able to rate subjective pain? yes  -CN      Pre-Treatment Pain Level 0  -CN         Total Minutes    70672 - PT Therapeutic Exercise Minutes 45  -CN          Exercise 1    Exercise Name 1 modified piriformis stretch  -CN      Cueing 1 Verbal  -CN      Reps 1 3  -CN      Time 1 20  -CN         Exercise 4    Exercise Name 4 LTR  -CN      Sets 4 2  -CN      Reps 4 10  -CN      Time 4 5 sec  -CN      Additional Comments on blue ball  -CN         Exercise 5    Exercise Name 5 Nustep L4 UE/LE  -CN      Time 5 5 min  -CN         Exercise 6    Exercise Name 6 Supine TA with marches  -CN      Cueing 6 Verbal  -CN      Sets 6 2  -CN      Reps 6 10  -CN         Exercise 7    Exercise Name 7 DKTC  -CN      Cueing 7 Verbal  -CN      Sets 7 2  -CN      Reps 7 10  -CN      Additional Comments holding with arms  -CN         Exercise 8    Exercise Name 8 standing row on blue foam  -CN      Sets 8 2  -CN      Reps 8 10  -CN      Additional Comments RTB  -CN         Exercise 9    Exercise Name 9 90/90 HS stretch  -CN      Cueing 9 Demo;Verbal  -CN      Reps 9 3  -CN      Time 9 20 sec  -CN         Exercise 11    Exercise Name 11 Bridge with feet on blue foam  -CN      Cueing 11 Demo  -CN      Sets 11 2  -CN      Reps 11 10  -CN      Time 11 3 seconds  -CN         Exercise 13    Exercise Name 13 ballet bars: sidestepping, retro, tandem  -CN      Reps 13 4 laps  -CN         Exercise 16    Exercise Name 16 wall wash  -CN      Reps 16 10  -CN         Exercise 17    Exercise Name 17 standing march with focus on posture at mirror  -CN      Reps 17 20  -CN         Exercise 18    Exercise Name 18 standing hip extension  -CN      Sets 18 2  -CN      Reps 18 10  -CN      Additional Comments B  -CN         Exercise 19    Exercise Name 19 stagger stance with small trunk rotation  -CN      Cueing 19 Demo  -CN      Reps 19 10 ea  -CN      Additional Comments L1 ball  -CN        User Key  (r) = Recorded By, (t) = Taken By, (c) = Cosigned By    Initials Name Provider Type    KRISTINA Schaeffer PT Physical Therapist                             Therapy Education  Given: Symptoms/condition  management, Posture/body mechanics  Program: Reinforced  How Provided: Verbal  Provided to: Patient  Level of Understanding: Teach back education performed, Demonstrated, Verbalized              Time Calculation:   Start Time: 1645  Stop Time: 1730  Time Calculation (min): 45 min  Therapy Suggested Charges     Code   Minutes Charges    92282 (CPT®) Hc Pt Neuromusc Re Education Ea 15 Min      55473 (CPT®) Hc Pt Ther Proc Ea 15 Min 45 3    65685 (CPT®) Hc Gait Training Ea 15 Min      29919 (CPT®) Hc Pt Therapeutic Act Ea 15 Min      81093 (CPT®) Hc Pt Manual Therapy Ea 15 Min      45134 (CPT®) Hc Pt Ther Massage- Per 15 Min      20958 (CPT®) Hc Pt Iontophoresis Ea 15 Min      27662 (CPT®) Hc Pt Elec Stim Ea-Per 15 Min      27276 (CPT®) Hc Pt Ultrasound Ea 15 Min      67432 (CPT®) Hc Pt Self Care/Mgmt/Train Ea 15 Min      Total  45 3        Therapy Charges for Today     Code Description Service Date Service Provider Modifiers Qty    77651077235 HC PT THER PROC EA 15 MIN 6/14/2018 Latoya Schaeffer, PT GP 3                    Latoya Schaeffer, PT  6/14/2018

## 2018-06-20 ENCOUNTER — HOSPITAL ENCOUNTER (OUTPATIENT)
Dept: PHYSICAL THERAPY | Facility: HOSPITAL | Age: 83
Setting detail: THERAPIES SERIES
Discharge: HOME | End: 2018-06-20

## 2018-06-20 DIAGNOSIS — M25.552 LEFT HIP PAIN: Primary | ICD-10-CM

## 2018-06-20 PROCEDURE — 97110 THERAPEUTIC EXERCISES: CPT

## 2018-06-20 RX ORDER — TRAMADOL HYDROCHLORIDE 50 MG/1
50 TABLET ORAL
Qty: 30 TABLET | Refills: 0 | OUTPATIENT
Start: 2018-06-20 | End: 2018-10-11

## 2018-06-20 NOTE — THERAPY TREATMENT NOTE
Outpatient Physical Therapy Ortho Treatment Note  [facility]     Patient Name: Trudy Castano  : 10/12/1932  MRN: [id number]  Today's Date: 2018      Visit Date: 2018    Visit Dx:    ICD-10-CM ICD-9-CM   1. Left hip pain M25.552 719.45       Patient Active Problem List   Diagnosis   • Echocardiogram abnormal   • Abnormal electrocardiogram   • Depression   • Diabetes mellitus   • Fatigue   • Glaucoma   • Hyperlipidemia   • Hypertension   • Osteoporosis   • Palpitations   • Premature atrial contraction   • Ventricular premature beats   • Sinus bradycardia   • Shortness of breath   • Vasovagal syncope   • Weak pulse   • Communicating hydrocephalus   • Intermittent asthma   • Oropharyngeal dysphagia   • Precordial pain   • Abnormal cardiovascular stress test   • Chronic coronary artery disease   • Supraventricular tachycardia   • Bradycardia   • Coronary artery disease involving native coronary artery of native heart without angina pectoris   • Pacemaker        Past Medical History:   Diagnosis Date   • Arrhythmia    • Benign essential hypertension    • Broken bones    • Cancer     skin cancer   • Chronic diastolic CHF (congestive heart failure)    • COPD (chronic obstructive pulmonary disease)    • Depression    • Fatigue    • Glaucoma    • Headache    • Hyperlipidemia    • Osteoporosis    • Palpitations    • Pneumonia    • Premature atrial complex    • Premature ventricular contractions    • Shortness of breath    • Sinus bradycardia    • Vasovagal syncope    • Weak pulse         Past Surgical History:   Procedure Laterality Date   • BILATERAL BREAST REDUCTION     • CARDIAC CATHETERIZATION      OUTCOME: SUCCESSFUL   • CARDIAC CATHETERIZATION N/A 3/25/2016    Procedure: Left Heart Cath;  Surgeon: Phoebe Bowles MD;  Location: [facility] INVASIVE LOCATION;  Service:    • CARDIAC CATHETERIZATION N/A 3/25/2016    Procedure: Left ventriculography;  Surgeon: Phoebe Bowles MD;  Location:   [facility] INVASIVE LOCATION;  Service:    • CARDIAC CATHETERIZATION N/A 3/25/2016    Procedure: Coronary angiography;  Surgeon: Phoebe Bowles MD;  Location:  [facility] INVASIVE LOCATION;  Service:    • CARDIAC ELECTROPHYSIOLOGY PROCEDURE N/A 2/16/2017    Procedure: Pacemaker DC new  [place];  Surgeon: Phoebe Bowles MD;  Location:  [facility] INVASIVE LOCATION;  Service:    • CERVICAL FUSION     • ECTOPIC PREGNANCY     • ELBOW DEBRIDEMENT     • EYE SURGERY     • HEMORRHOIDECTOMY     • HYSTERECTOMY     • INSERT / REPLACE / REMOVE PACEMAKER     • LUMBAR PUNCTURE      large volume lumbar puncture 35ml   • NECK SURGERY     • OOPHORECTOMY      FOR ECTOPIC PREGNANCY   • SPINAL FUSION  2012    C3-C4 Fusion Dr. Millard                             PT Assessment/Plan     Row Name 06/20/18 4484          PT Assessment    Assessment Comments Pt continues with poor posture at times, able to correct with cueing. Decreased pain with rising from a chair and improved hip stability overall with exercises. Initiated SLS with 1 UE support with cueing for TA contraction and hip abductor stability.   -CN        PT Plan    PT Plan Comments Plan to D/C next visit pending no increase in symptoms.   -CN       User Key  (r) = Recorded By, (t) = Taken By, (c) = Cosigned By    Initials Name Provider Type    CN Latoya Schaeffer, PT Physical Therapist                    Exercises     Row Name 06/20/18 1500             Subjective Comments    Subjective Comments It is feeling pretty good. The hip is good as long as I take my pain pill in the morning. I only take one a day.   -CN         Subjective Pain    Able to rate subjective pain? yes  -CN      Pre-Treatment Pain Level 2  -CN         Total Minutes    57351 - PT Therapeutic Exercise Minutes 43  -CN         Exercise 1    Exercise Name 1 modified piriformis stretch  -CN      Cueing 1 Verbal  -CN      Reps 1 3  -CN      Time 1 20  -CN         Exercise 4    Exercise Name 4 LTR   -CN      Sets 4 2  -CN      Reps 4 10  -CN      Time 4 5 sec  -CN      Additional Comments on blue ball  -CN         Exercise 5    Exercise Name 5 Nustep L5 UE/LE  -CN      Time 5 5 min  -CN         Exercise 6    Exercise Name 6 Supine TA with marches  -CN      Cueing 6 Verbal  -CN      Sets 6 2  -CN      Reps 6 10  -CN         Exercise 7    Exercise Name 7 DKTC  -CN      Cueing 7 Verbal  -CN      Sets 7 2  -CN      Reps 7 10  -CN      Additional Comments holding with arms  -CN         Exercise 8    Exercise Name 8 standing row on blue foam  -CN      Sets 8 2  -CN      Reps 8 10  -CN      Additional Comments RTB  -CN         Exercise 9    Exercise Name 9 90/90 HS stretch  -CN      Cueing 9 Demo;Verbal  -CN      Reps 9 3  -CN      Time 9 20 sec  -CN         Exercise 11    Exercise Name 11 --  -CN      Cueing 11 --  -CN      Sets 11 --  -CN      Reps 11 --  -CN      Time 11 --  -CN         Exercise 13    Exercise Name 13 ballet bars: sidestepping, retro, tandem  -CN      Reps 13 4 laps  -CN         Exercise 15    Exercise Name 15 SLS with 1 UE support  -CN      Cueing 15 Demo  -CN      Reps 15 2  -CN      Time 15 20 sec  -CN         Exercise 16    Exercise Name 16 wall wash  -CN      Reps 16 10  -CN         Exercise 17    Exercise Name 17 standing march with focus on posture at mirror  -CN      Reps 17 20  -CN         Exercise 18    Exercise Name 18 standing hip extension  -CN      Sets 18 2  -CN      Reps 18 10  -CN      Additional Comments B  -CN         Exercise 19    Exercise Name 19 stagger stance with small trunk rotation  -CN      Cueing 19 Demo  -CN      Reps 19 10 ea  -CN      Additional Comments L1 ball  -CN        User Key  (r) = Recorded By, (t) = Taken By, (c) = Cosigned By    Initials Name Provider Type    KRISTINA Schaeffer, PT Physical Therapist                               PT OP Goals     Row Name 06/20/18 1600          PT Short Term Goals    STG Date to Achieve 05/14/18  -CN     STG 1 Pt will  demonstrate understanding and compliance with initial HEP.  -CN     STG 1 Progress Met  -CN     STG 2 Pt will report use of ice and appropriate activity modifications to allow L hip healing.   -CN     STG 2 Progress Met  -CN     STG 3 Pt will tolerate 45 minute flexibility and stabilization exercises without inc L hip pain.  -CN     STG 3 Progress Met  -CN        Long Term Goals    LTG Date to Achieve 05/30/18  -CN     LTG 1 Pt will report improved tolerance to walking from 10 minutes to 30 minutes.  -CN     LTG 1 Progress Ongoing  -CN     LTG 2 Pt will report reduced L hip pain at worse from 6/10 to 3/10 or better.  -CN     LTG 2 Progress Progressing  -CN     LTG 2 Progress Comments Pt reports pain rated 2/10 today.   -CN     LTG 3 Pt will demonstrate reduced overall disability from 65% disability to 50% or less.  -CN     LTG 3 Progress Met  -CN       User Key  (r) = Recorded By, (t) = Taken By, (c) = Cosigned By    Initials Name Provider Type    KRISTINA Schaeffer, PT Physical Therapist          Therapy Education  Given: Symptoms/condition management, Posture/body mechanics  Program: Reinforced  How Provided: Verbal  Provided to: Patient  Level of Understanding: Teach back education performed, Demonstrated, Verbalized              Time Calculation:   Start Time: 1502  Stop Time: 1545  Time Calculation (min): 43 min  Therapy Suggested Charges     Code   Minutes Charges    26528 (CPT®) Hc Pt Neuromusc Re Education Ea 15 Min      17832 (CPT®) Hc Pt Ther Proc Ea 15 Min 43 3    10953 (CPT®) Hc Gait Training Ea 15 Min      85692 (CPT®) Hc Pt Therapeutic Act Ea 15 Min      14339 (CPT®) Hc Pt Manual Therapy Ea 15 Min      72408 (CPT®) Hc Pt Ther Massage- Per 15 Min      72204 (CPT®) Hc Pt Iontophoresis Ea 15 Min      04541 (CPT®) Hc Pt Elec Stim Ea-Per 15 Min      11257 (CPT®) Hc Pt Ultrasound Ea 15 Min      04895 (CPT®) Hc Pt Self Care/Mgmt/Train Ea 15 Min      Total  43 3        Therapy Charges for Today     Code  Description Service Date Service Provider Modifiers Qty    34448040740 HC PT THER PROC EA 15 MIN 6/20/2018 Latoya Schaeffer, PT GP 3                    Latoya Schaeffer, PT  6/20/2018

## 2018-06-27 ENCOUNTER — HOSPITAL ENCOUNTER (OUTPATIENT)
Dept: PHYSICAL THERAPY | Facility: HOSPITAL | Age: 83
Setting detail: THERAPIES SERIES
Discharge: HOME | End: 2018-06-27

## 2018-06-27 DIAGNOSIS — M25.552 LEFT HIP PAIN: Primary | ICD-10-CM

## 2018-06-27 PROCEDURE — 97110 THERAPEUTIC EXERCISES: CPT

## 2018-06-27 PROCEDURE — G8978 MOBILITY CURRENT STATUS: HCPCS

## 2018-06-27 PROCEDURE — G8979 MOBILITY GOAL STATUS: HCPCS

## 2018-06-27 NOTE — THERAPY DISCHARGE NOTE
Outpatient Physical Therapy Ortho Treatment Note/Discharge Summary  [facility]     Patient Name: Trudy Castano  : 10/12/1932  MRN: [id number]  Today's Date: 2018      Visit Date: 2018    Visit Dx:    ICD-10-CM ICD-9-CM   1. Left hip pain M25.552 719.45       Patient Active Problem List   Diagnosis   • Echocardiogram abnormal   • Abnormal electrocardiogram   • Depression   • Diabetes mellitus   • Fatigue   • Glaucoma   • Hyperlipidemia   • Hypertension   • Osteoporosis   • Palpitations   • Premature atrial contraction   • Ventricular premature beats   • Sinus bradycardia   • Shortness of breath   • Vasovagal syncope   • Weak pulse   • Communicating hydrocephalus   • Intermittent asthma   • Oropharyngeal dysphagia   • Precordial pain   • Abnormal cardiovascular stress test   • Chronic coronary artery disease   • Supraventricular tachycardia   • Bradycardia   • Coronary artery disease involving native coronary artery of native heart without angina pectoris   • Pacemaker        Past Medical History:   Diagnosis Date   • Arrhythmia    • Benign essential hypertension    • Broken bones    • Cancer     skin cancer   • Chronic diastolic CHF (congestive heart failure)    • COPD (chronic obstructive pulmonary disease)    • Depression    • Fatigue    • Glaucoma    • Headache    • Hyperlipidemia    • Osteoporosis    • Palpitations    • Pneumonia    • Premature atrial complex    • Premature ventricular contractions    • Shortness of breath    • Sinus bradycardia    • Vasovagal syncope    • Weak pulse         Past Surgical History:   Procedure Laterality Date   • BILATERAL BREAST REDUCTION     • CARDIAC CATHETERIZATION      OUTCOME: SUCCESSFUL   • CARDIAC CATHETERIZATION N/A 3/25/2016    Procedure: Left Heart Cath;  Surgeon: Phoebe Bowles MD;  Location: [facility] INVASIVE LOCATION;  Service:    • CARDIAC CATHETERIZATION N/A 3/25/2016    Procedure: Left ventriculography;  Surgeon: Phoebe  MD Jelena;  Location:  [facility] CATH INVASIVE LOCATION;  Service:    • CARDIAC CATHETERIZATION N/A 3/25/2016    Procedure: Coronary angiography;  Surgeon: Phoebe Bowles MD;  Location:  [facility] CATH INVASIVE LOCATION;  Service:    • CARDIAC ELECTROPHYSIOLOGY PROCEDURE N/A 2/16/2017    Procedure: Pacemaker DC new  [place];  Surgeon: Phoebe Bowles MD;  Location:  [facility] CATH INVASIVE LOCATION;  Service:    • CERVICAL FUSION     • ECTOPIC PREGNANCY     • ELBOW DEBRIDEMENT     • EYE SURGERY     • HEMORRHOIDECTOMY     • HYSTERECTOMY     • INSERT / REPLACE / REMOVE PACEMAKER     • LUMBAR PUNCTURE      large volume lumbar puncture 35ml   • NECK SURGERY     • OOPHORECTOMY      FOR ECTOPIC PREGNANCY   • SPINAL FUSION  2012    C3-C4 Fusion Dr. Millard                                     Exercises     Row Name 06/27/18 1600             Subjective Comments    Subjective Comments Well yesterday it was hurting in my low back, but it is better today. I have good days and bad days.   -CN         Subjective Pain    Able to rate subjective pain? yes  -CN      Pre-Treatment Pain Level 2  -CN         Total Minutes    39691 - PT Therapeutic Exercise Minutes 40  -CN         Exercise 1    Exercise Name 1 modified piriformis stretch  -CN      Cueing 1 Verbal  -CN      Reps 1 3  -CN      Time 1 20  -CN         Exercise 4    Exercise Name 4 LTR  -CN      Sets 4 2  -CN      Reps 4 10  -CN      Time 4 5 sec  -CN      Additional Comments on blue ball  -CN         Exercise 5    Exercise Name 5 Nustep L5 UE/LE  -CN      Time 5 5 min  -CN         Exercise 6    Exercise Name 6 Supine TA with marches  -CN      Cueing 6 Verbal  -CN      Sets 6 2  -CN      Reps 6 10  -CN         Exercise 7    Exercise Name 7 DKTC  -CN      Cueing 7 Verbal  -CN      Sets 7 2  -CN      Reps 7 10  -CN      Additional Comments holding with arms  -CN         Exercise 8    Exercise Name 8 standing row on blue foam  -CN      Sets 8 2  -CN      Reps 8 10  -CN       Additional Comments RTB  -CN         Exercise 9    Exercise Name 9 90/90 HS stretch  -CN      Cueing 9 Demo;Verbal  -CN      Reps 9 3  -CN      Time 9 20 sec  -CN         Exercise 13    Exercise Name 13 ballet bars: sidestepping, retro, tandem  -CN      Reps 13 4 laps  -CN         Exercise 15    Exercise Name 15 SLS with 1 UE support  -CN      Cueing 15 Demo  -CN      Reps 15 2  -CN      Time 15 20 sec  -CN        User Key  (r) = Recorded By, (t) = Taken By, (c) = Cosigned By    Initials Name Provider Type    KRISTINA Schaeffer, PT Physical Therapist                               PT OP Goals     Row Name 06/27/18 1600          PT Short Term Goals    STG Date to Achieve 05/14/18  -CN     STG 1 Pt will demonstrate understanding and compliance with initial HEP.  -CN     STG 1 Progress Met  -CN     STG 2 Pt will report use of ice and appropriate activity modifications to allow L hip healing.   -CN     STG 2 Progress Met  -CN     STG 3 Pt will tolerate 45 minute flexibility and stabilization exercises without inc L hip pain.  -CN     STG 3 Progress Met  -CN        Long Term Goals    LTG Date to Achieve 05/30/18  -CN     LTG 1 Pt will report improved tolerance to walking from 10 minutes to 30 minutes.  -CN     LTG 1 Progress Partially Met  -CN     LTG 1 Progress Comments Able to ambulate 20-25 min with SPC.   -CN     LTG 2 Pt will report reduced L hip pain at worse from 6/10 to 3/10 or better.  -CN     LTG 2 Progress Partially Met  -CN     LTG 2 Progress Comments Pain at worse reaches 4/10.   -CN     LTG 3 Pt will demonstrate reduced overall disability from 65% disability to 50% or less.  -CN     LTG 3 Progress Met  -CN       User Key  (r) = Recorded By, (t) = Taken By, (c) = Cosigned By    Initials Name Provider Type    KRISTINA Schaeffer, PT Physical Therapist          Therapy Education  Given: Symptoms/condition management, Posture/body mechanics  Program: Reinforced  How Provided: Verbal  Provided to:  Patient  Level of Understanding: Teach back education performed, Demonstrated, Verbalized    Outcome Measure Options: Lower Extremity Functional Scale (LEFS)         Time Calculation:   Start Time: 1630  Stop Time: 1710  Time Calculation (min): 40 min  Therapy Suggested Charges     Code   Minutes Charges    05148 (CPT®) Hc Pt Neuromusc Re Education Ea 15 Min      85715 (CPT®) Hc Pt Ther Proc Ea 15 Min 40 3    89572 (CPT®) Hc Gait Training Ea 15 Min      92374 (CPT®) Hc Pt Therapeutic Act Ea 15 Min      71776 (CPT®) Hc Pt Manual Therapy Ea 15 Min      22501 (CPT®) Hc Pt Ther Massage- Per 15 Min      69798 (CPT®) Hc Pt Iontophoresis Ea 15 Min      17383 (CPT®) Hc Pt Elec Stim Ea-Per 15 Min      72077 (CPT®) Hc Pt Ultrasound Ea 15 Min      80393 (CPT®) Hc Pt Self Care/Mgmt/Train Ea 15 Min      Total  40 3        Therapy Charges for Today     Code Description Service Date Service Provider Modifiers Qty    70219171222 HC PT THER PROC EA 15 MIN 6/27/2018 Latoya Schaeffer, PT GP 3    72258206524 HC PT MOBILITY CURRENT 6/27/2018 Latoya Schaeffer, PT GP, CJ 1    89247400020 HC PT MOBILITY PROJECTED 6/27/2018 Latoya Schaeffer, PT GP, CK 1          PT G-Codes  PT Professional Judgement Used?: Yes  Outcome Measure Options: Lower Extremity Functional Scale (LEFS)  Score: 45% where 100% is no disability despite subjective reports of improvement  Functional Limitation: Mobility: Walking and moving around  Mobility: Walking and Moving Around Current Status (): At least 20 percent but less than 40 percent impaired, limited or restricted  Mobility: Walking and Moving Around Goal Status (): At least 40 percent but less than 60 percent impaired, limited or restricted     OP PT Discharge Summary  Date of Discharge: 06/27/18  Reason for Discharge: Maximum functional potential achieved  Outcomes Achieved: Patient able to partially acheive established goals  Discharge Destination: Home with home  program  Discharge Instructions/Additional Comments: Pt attended 11 skilled therapy sessions for treatment of L hip pain s/p fall at home. Pt reports significant improvement in condition with HEP, however continues with intermittent pain at times. Discussed arthritis with patient and emphasized importance of compliance with HEP as well as safety at home to prevent future falls. Pt with poor posture at times, however able to correct with cueing for TA contraction. Pt is independent with current program and D/C to HEP today.       Latoya Schaeffer, PT  6/27/2018

## 2018-07-06 ENCOUNTER — CLINICAL SUPPORT NO REQUIREMENTS (OUTPATIENT)
Dept: CARDIOLOGY | Facility: CLINIC | Age: 83
End: 2018-07-06

## 2018-07-06 DIAGNOSIS — Z95.0 PACEMAKER: Primary | ICD-10-CM

## 2018-07-06 PROCEDURE — 93296 REM INTERROG EVL PM/IDS: CPT | Performed by: INTERNAL MEDICINE

## 2018-07-06 PROCEDURE — 93294 REM INTERROG EVL PM/LDLS PM: CPT | Performed by: INTERNAL MEDICINE

## 2018-08-08 ENCOUNTER — TRANSCRIBE ORDERS (OUTPATIENT)
Dept: CARDIOLOGY | Facility: CLINIC | Age: 83
End: 2018-08-08

## 2018-08-08 DIAGNOSIS — I25.10 CORONARY ARTERY DISEASE INVOLVING NATIVE CORONARY ARTERY OF NATIVE HEART WITHOUT ANGINA PECTORIS: ICD-10-CM

## 2018-08-08 DIAGNOSIS — R06.02 SHORTNESS OF BREATH: Primary | ICD-10-CM

## 2018-09-25 ENCOUNTER — APPOINTMENT (OUTPATIENT)
Dept: CARDIOLOGY | Facility: HOSPITAL | Age: 83
End: 2018-09-25
Attending: INTERNAL MEDICINE

## 2018-10-11 ENCOUNTER — OFFICE VISIT (OUTPATIENT)
Dept: CARDIOLOGY | Facility: CLINIC | Age: 83
End: 2018-10-11

## 2018-10-11 ENCOUNTER — HOSPITAL ENCOUNTER (OUTPATIENT)
Dept: CARDIOLOGY | Facility: HOSPITAL | Age: 83
Discharge: HOME | End: 2018-10-11
Attending: INTERNAL MEDICINE | Admitting: INTERNAL MEDICINE

## 2018-10-11 ENCOUNTER — CLINICAL SUPPORT NO REQUIREMENTS (OUTPATIENT)
Dept: CARDIOLOGY | Facility: CLINIC | Age: 83
End: 2018-10-11

## 2018-10-11 VITALS
DIASTOLIC BLOOD PRESSURE: 81 MMHG | BODY MASS INDEX: 23.36 KG/M2 | WEIGHT: 119 LBS | HEART RATE: 85 BPM | SYSTOLIC BLOOD PRESSURE: 148 MMHG | HEIGHT: 60 IN

## 2018-10-11 VITALS
HEART RATE: 78 BPM | DIASTOLIC BLOOD PRESSURE: 82 MMHG | SYSTOLIC BLOOD PRESSURE: 153 MMHG | BODY MASS INDEX: 21.99 KG/M2 | WEIGHT: 112 LBS | HEIGHT: 60 IN

## 2018-10-11 DIAGNOSIS — Z95.0 PACEMAKER: ICD-10-CM

## 2018-10-11 DIAGNOSIS — R55 VASOVAGAL SYNCOPE: Primary | ICD-10-CM

## 2018-10-11 DIAGNOSIS — Z95.0 PACEMAKER: Primary | ICD-10-CM

## 2018-10-11 DIAGNOSIS — I49.1 PREMATURE ATRIAL CONTRACTION: ICD-10-CM

## 2018-10-11 DIAGNOSIS — Z45.018 PACEMAKER REPROGRAMMING/CHECK: ICD-10-CM

## 2018-10-11 LAB
BH CV ECHO MEAS - ACS: 1.5 CM
BH CV ECHO MEAS - AI DEC SLOPE: 270 CM/SEC^2
BH CV ECHO MEAS - AI MAX PG: 60.2 MMHG
BH CV ECHO MEAS - AI MAX VEL: 388 CM/SEC
BH CV ECHO MEAS - AI P1/2T: 420.9 MSEC
BH CV ECHO MEAS - AO MAX PG (FULL): 3.4 MMHG
BH CV ECHO MEAS - AO MAX PG: 6.1 MMHG
BH CV ECHO MEAS - AO MEAN PG (FULL): 2 MMHG
BH CV ECHO MEAS - AO MEAN PG: 3 MMHG
BH CV ECHO MEAS - AO ROOT AREA (BSA CORRECTED): 2.1
BH CV ECHO MEAS - AO ROOT AREA: 7.5 CM^2
BH CV ECHO MEAS - AO ROOT DIAM: 3.1 CM
BH CV ECHO MEAS - AO V2 MAX: 123 CM/SEC
BH CV ECHO MEAS - AO V2 MEAN: 81.2 CM/SEC
BH CV ECHO MEAS - AO V2 VTI: 24.6 CM
BH CV ECHO MEAS - AVA(I,A): 2.2 CM^2
BH CV ECHO MEAS - AVA(I,D): 2.2 CM^2
BH CV ECHO MEAS - AVA(V,A): 2.1 CM^2
BH CV ECHO MEAS - AVA(V,D): 2.1 CM^2
BH CV ECHO MEAS - BSA(HAYCOCK): 1.5 M^2
BH CV ECHO MEAS - BSA: 1.5 M^2
BH CV ECHO MEAS - BZI_BMI: 23.2 KILOGRAMS/M^2
BH CV ECHO MEAS - BZI_METRIC_HEIGHT: 152.4 CM
BH CV ECHO MEAS - BZI_METRIC_WEIGHT: 54 KG
BH CV ECHO MEAS - EDV(CUBED): 50.7 ML
BH CV ECHO MEAS - EDV(MOD-SP2): 46 ML
BH CV ECHO MEAS - EDV(MOD-SP4): 55 ML
BH CV ECHO MEAS - EDV(TEICH): 58.1 ML
BH CV ECHO MEAS - EF(CUBED): 69.2 %
BH CV ECHO MEAS - EF(MOD-BP): 63 %
BH CV ECHO MEAS - EF(MOD-SP2): 60.9 %
BH CV ECHO MEAS - EF(MOD-SP4): 67.3 %
BH CV ECHO MEAS - EF(TEICH): 61.6 %
BH CV ECHO MEAS - ESV(CUBED): 15.6 ML
BH CV ECHO MEAS - ESV(MOD-SP2): 18 ML
BH CV ECHO MEAS - ESV(MOD-SP4): 18 ML
BH CV ECHO MEAS - ESV(TEICH): 22.3 ML
BH CV ECHO MEAS - FS: 32.4 %
BH CV ECHO MEAS - IVS/LVPW: 0.92
BH CV ECHO MEAS - IVSD: 1.1 CM
BH CV ECHO MEAS - LA DIMENSION: 3.7 CM
BH CV ECHO MEAS - LA/AO: 1.2
BH CV ECHO MEAS - LAT PEAK E' VEL: 6 CM/SEC
BH CV ECHO MEAS - LV DIASTOLIC VOL/BSA (35-75): 36.7 ML/M^2
BH CV ECHO MEAS - LV MASS(C)D: 138.2 GRAMS
BH CV ECHO MEAS - LV MASS(C)DI: 92.3 GRAMS/M^2
BH CV ECHO MEAS - LV MAX PG: 2.6 MMHG
BH CV ECHO MEAS - LV MEAN PG: 1 MMHG
BH CV ECHO MEAS - LV SYSTOLIC VOL/BSA (12-30): 12 ML/M^2
BH CV ECHO MEAS - LV V1 MAX: 81.3 CM/SEC
BH CV ECHO MEAS - LV V1 MEAN: 54.7 CM/SEC
BH CV ECHO MEAS - LV V1 VTI: 17 CM
BH CV ECHO MEAS - LVIDD: 3.7 CM
BH CV ECHO MEAS - LVIDS: 2.5 CM
BH CV ECHO MEAS - LVLD AP2: 6.7 CM
BH CV ECHO MEAS - LVLD AP4: 6.7 CM
BH CV ECHO MEAS - LVLS AP2: 5.2 CM
BH CV ECHO MEAS - LVLS AP4: 4.9 CM
BH CV ECHO MEAS - LVOT AREA (M): 3.1 CM^2
BH CV ECHO MEAS - LVOT AREA: 3.1 CM^2
BH CV ECHO MEAS - LVOT DIAM: 2 CM
BH CV ECHO MEAS - LVPWD: 1.2 CM
BH CV ECHO MEAS - MED PEAK E' VEL: 4.9 CM/SEC
BH CV ECHO MEAS - MV A DUR: 0.19 SEC
BH CV ECHO MEAS - MV A MAX VEL: 108 CM/SEC
BH CV ECHO MEAS - MV DEC SLOPE: 270 CM/SEC^2
BH CV ECHO MEAS - MV DEC TIME: 0.18 SEC
BH CV ECHO MEAS - MV E MAX VEL: 64 CM/SEC
BH CV ECHO MEAS - MV E/A: 0.59
BH CV ECHO MEAS - MV MAX PG: 5 MMHG
BH CV ECHO MEAS - MV MEAN PG: 2 MMHG
BH CV ECHO MEAS - MV P1/2T MAX VEL: 80.2 CM/SEC
BH CV ECHO MEAS - MV P1/2T: 87 MSEC
BH CV ECHO MEAS - MV V2 MAX: 112 CM/SEC
BH CV ECHO MEAS - MV V2 MEAN: 61.1 CM/SEC
BH CV ECHO MEAS - MV V2 VTI: 22.9 CM
BH CV ECHO MEAS - MVA P1/2T LCG: 2.7 CM^2
BH CV ECHO MEAS - MVA(P1/2T): 2.5 CM^2
BH CV ECHO MEAS - MVA(VTI): 2.3 CM^2
BH CV ECHO MEAS - PA ACC TIME: 0.09 SEC
BH CV ECHO MEAS - PA MAX PG (FULL): 3.6 MMHG
BH CV ECHO MEAS - PA MAX PG: 4.6 MMHG
BH CV ECHO MEAS - PA PR(ACCEL): 37.6 MMHG
BH CV ECHO MEAS - PA V2 MAX: 107 CM/SEC
BH CV ECHO MEAS - PI END-D VEL: 86.9 CM/SEC
BH CV ECHO MEAS - PULM A REVS DUR: 0.13 SEC
BH CV ECHO MEAS - PULM A REVS VEL: 38.8 CM/SEC
BH CV ECHO MEAS - PULM DIAS VEL: 44.6 CM/SEC
BH CV ECHO MEAS - PULM S/D: 1.1
BH CV ECHO MEAS - PULM SYS VEL: 49 CM/SEC
BH CV ECHO MEAS - PVA(V,A): 1.5 CM^2
BH CV ECHO MEAS - PVA(V,D): 1.5 CM^2
BH CV ECHO MEAS - QP/QS: 0.61
BH CV ECHO MEAS - RAP SYSTOLE: 3 MMHG
BH CV ECHO MEAS - RV MAX PG: 1 MMHG
BH CV ECHO MEAS - RV MEAN PG: 1 MMHG
BH CV ECHO MEAS - RV V1 MAX: 50.2 CM/SEC
BH CV ECHO MEAS - RV V1 MEAN: 34.1 CM/SEC
BH CV ECHO MEAS - RV V1 VTI: 10.3 CM
BH CV ECHO MEAS - RVDD: 2.7 CM
BH CV ECHO MEAS - RVOT AREA: 3.1 CM^2
BH CV ECHO MEAS - RVOT DIAM: 2 CM
BH CV ECHO MEAS - RVSP: 29.8 MMHG
BH CV ECHO MEAS - SI(AO): 124 ML/M^2
BH CV ECHO MEAS - SI(CUBED): 23.4 ML/M^2
BH CV ECHO MEAS - SI(LVOT): 35.7 ML/M^2
BH CV ECHO MEAS - SI(MOD-SP2): 18.7 ML/M^2
BH CV ECHO MEAS - SI(MOD-SP4): 24.7 ML/M^2
BH CV ECHO MEAS - SI(TEICH): 23.9 ML/M^2
BH CV ECHO MEAS - SV(AO): 185.7 ML
BH CV ECHO MEAS - SV(CUBED): 35 ML
BH CV ECHO MEAS - SV(LVOT): 53.4 ML
BH CV ECHO MEAS - SV(MOD-SP2): 28 ML
BH CV ECHO MEAS - SV(MOD-SP4): 37 ML
BH CV ECHO MEAS - SV(RVOT): 32.4 ML
BH CV ECHO MEAS - SV(TEICH): 35.8 ML
BH CV ECHO MEAS - TAPSE (>1.6): 1.2 CM2
BH CV ECHO MEAS - TR MAX VEL: 259 CM/SEC
BH CV ECHO MEASUREMENTS AVERAGE E/E' RATIO: 11.74
BH CV XLRA - RV BASE: 2.8 CM
BH CV XLRA - RV LENGTH: 6.5 CM
BH CV XLRA - RV MID: 1.6 CM
BH CV XLRA - TDI S': 12.9 CM/SEC
LEFT ATRIUM VOLUME INDEX: 31 ML/M2
MAXIMAL PREDICTED HEART RATE: 135 BPM
STRESS TARGET HR: 115 BPM

## 2018-10-11 PROCEDURE — 93000 ELECTROCARDIOGRAM COMPLETE: CPT | Performed by: INTERNAL MEDICINE

## 2018-10-11 PROCEDURE — 93306 TTE W/DOPPLER COMPLETE: CPT | Performed by: INTERNAL MEDICINE

## 2018-10-11 PROCEDURE — 99213 OFFICE O/P EST LOW 20 MIN: CPT | Performed by: INTERNAL MEDICINE

## 2018-10-11 PROCEDURE — 93306 TTE W/DOPPLER COMPLETE: CPT

## 2018-10-11 PROCEDURE — 93280 PM DEVICE PROGR EVAL DUAL: CPT | Performed by: INTERNAL MEDICINE

## 2018-10-11 RX ORDER — METFORMIN HYDROCHLORIDE 500 MG/1
TABLET, EXTENDED RELEASE ORAL
COMMUNITY
Start: 2018-07-27 | End: 2020-06-22 | Stop reason: SDUPTHER

## 2018-10-11 NOTE — PROGRESS NOTES
" Subjective:        Trudy Castano is a 85 y.o. female who here for follow up    CC  The follow-up for the pacemaker PACs  HPI  86 his old female with PACs, vasovagal syncope as well as a pacemaker here for the follow-up with shortness of breath no different than before denies any chest pains or tightness in chest     Problem List Items Addressed This Visit        Cardiovascular and Mediastinum    Premature atrial contraction    Vasovagal syncope - Primary    Pacemaker        .    The following portions of the patient's history were reviewed and updated as appropriate: allergies, current medications, past family history, past medical history, past social history, past surgical history and problem list.    Past Medical History:   Diagnosis Date   • Arrhythmia    • Benign essential hypertension    • Broken bones    • Cancer (CMS/HCC)     skin cancer   • Chronic diastolic CHF (congestive heart failure) (CMS/HCC)    • COPD (chronic obstructive pulmonary disease) (CMS/HCC)    • Depression    • Fatigue    • Glaucoma    • Headache    • Hyperlipidemia    • Osteoporosis    • Palpitations    • Pneumonia    • Premature atrial complex    • Premature ventricular contractions    • Shortness of breath    • Sinus bradycardia    • Vasovagal syncope    • Weak pulse      reports that she quit smoking about 51 years ago. She has never used smokeless tobacco. She reports that she does not drink alcohol or use drugs.   Family History   Problem Relation Age of Onset   • Heart disease Mother        Review of Systems  Constitutional: No wt loss, fever, fatigue  Gastrointestinal: No nausea, abdominal pain  Behavioral/Psych: No insomnia or anxiety   Cardiovascular no chest pains or tightness in chest  Objective:               Physical Exam  /82   Pulse 78   Ht 152.4 cm (60\")   Wt 50.8 kg (112 lb)   BMI 21.87 kg/m²     General appearance: NAD, conversant   Eyes: anicteric sclerae, moist conjunctivae; no lid-lag; PERRLA   HENT: " Atraumatic; oropharynx clear with moist mucous membranes and no mucosal ulcerations;  normal hard and soft palate   Neck: Trachea midline; FROM, supple, no thyromegaly or lymphadenopathy   Lungs: CTA, with normal respiratory effort and no intercostal retractions   CV: S1-S2 regular, SYS murmurs, no rub, no gallop   Abdomen: Soft, non-tender; no masses or HSM   Extremities: No peripheral edema or extremity lymphadenopathy  Skin: Normal temperature, turgor and texture; no rash, ulcers or subcutaneous nodules   Psych: Appropriate affect, alert and oriented to person, place and time           Cardiographics  @  ECG 12 Lead  Date/Time: 10/11/2018 11:05 AM  Performed by: GENO KHAN  Authorized by: GENO KHAN   Comparison: compared with previous ECG   Similar to previous ECG  Rhythm: sinus rhythm  ST Flattening: all  Clinical impression: non-specific ECG            Echocardiogram:        Current Outpatient Prescriptions:   •  aspirin 81 MG tablet, Take 81 mg by mouth daily., Disp: , Rfl:   •  cholecalciferol (VITAMIN D3) 1000 UNITS tablet, Take 1,000 Units by mouth Daily., Disp: , Rfl:   •  latanoprost (XALATAN) 0.005 % ophthalmic solution, 1 drop Every Night., Disp: , Rfl:   •  metFORMIN ER (GLUCOPHAGE-XR) 500 MG 24 hr tablet, TAKE 1 TABLET BY MOUTH EVERY DAY WITH BREAKFAST FOR 1 WEEK, THEN TAKE 2 TABLETS BY MOUTH EVERY DAY, Disp: , Rfl:   •  montelukast (SINGULAIR) 10 MG tablet, Take 10 mg by mouth Every Night., Disp: , Rfl:   •  nebivolol (BYSTOLIC) 2.5 MG tablet, Take 1 tablet by mouth Daily., Disp: 90 tablet, Rfl: 1  •  pravastatin (PRAVACHOL) 40 MG tablet, Take 40 mg by mouth Daily., Disp: , Rfl:    Assessment:        Patient Active Problem List   Diagnosis   • Echocardiogram abnormal   • Abnormal electrocardiogram   • Depression   • Diabetes mellitus (CMS/HCC)   • Fatigue   • Glaucoma   • Hyperlipidemia   • Hypertension   • Osteoporosis   • Palpitations   • Premature atrial contraction   •  Ventricular premature beats   • Sinus bradycardia   • Shortness of breath   • Vasovagal syncope   • Weak pulse   • Communicating hydrocephalus   • Intermittent asthma   • Oropharyngeal dysphagia   • Precordial pain   • Abnormal cardiovascular stress test   • Chronic coronary artery disease   • Supraventricular tachycardia (CMS/HCC)   • Bradycardia   • Coronary artery disease involving native coronary artery of native heart without angina pectoris   • Pacemaker               Plan:            ICD-10-CM ICD-9-CM   1. Vasovagal syncope R55 780.2   2. Premature atrial contraction I49.1 427.61   3. Pacemaker Z95.0 V45.01     1. Vasovagal syncope  No more syncope    2. Premature atrial contraction  Workup pending    3. Pacemaker  Pacemaker functioning normally     Echo ok    See in 1 yr  COUNSELING:    Trudy Trinh was given to patient for the following topics: diagnostic results, risk factor reductions, impressions, risks and benefits of treatment options and importance of treatment compliance .       SMOKING COUNSELING:    Counseling given: Not Answered      EMR Dragon/Transcription disclaimer:   Much of this encounter note is an electronic transcription/translation of spoken language to printed text. The electronic translation of spoken language may permit erroneous, or at times, nonsensical words or phrases to be inadvertently transcribed; Although I have reviewed the note for such errors, some may still exist.

## 2018-10-29 RX ORDER — NEBIVOLOL HYDROCHLORIDE 2.5 MG/1
TABLET ORAL
Qty: 90 TABLET | Refills: 1 | Status: SHIPPED | OUTPATIENT
Start: 2018-10-29 | End: 2019-04-28 | Stop reason: SDUPTHER

## 2019-01-04 ENCOUNTER — CLINICAL SUPPORT NO REQUIREMENTS (OUTPATIENT)
Dept: CARDIOLOGY | Facility: CLINIC | Age: 84
End: 2019-01-04

## 2019-01-04 DIAGNOSIS — Z95.0 PACEMAKER: Primary | ICD-10-CM

## 2019-01-04 PROCEDURE — 93294 REM INTERROG EVL PM/LDLS PM: CPT | Performed by: INTERNAL MEDICINE

## 2019-01-04 PROCEDURE — 93296 REM INTERROG EVL PM/IDS: CPT | Performed by: INTERNAL MEDICINE

## 2019-02-27 ENCOUNTER — OFFICE VISIT (OUTPATIENT)
Dept: ORTHOPEDIC SURGERY | Facility: CLINIC | Age: 84
End: 2019-02-27

## 2019-02-27 VITALS — TEMPERATURE: 97.2 F | BODY MASS INDEX: 21.6 KG/M2 | WEIGHT: 110 LBS | HEIGHT: 60 IN

## 2019-02-27 DIAGNOSIS — M25.552 HIP PAIN, LEFT: ICD-10-CM

## 2019-02-27 DIAGNOSIS — M54.50 LOW BACK PAIN RADIATING TO LEFT LEG: Primary | ICD-10-CM

## 2019-02-27 DIAGNOSIS — M79.605 LOW BACK PAIN RADIATING TO LEFT LEG: Primary | ICD-10-CM

## 2019-02-27 PROCEDURE — 99213 OFFICE O/P EST LOW 20 MIN: CPT | Performed by: ORTHOPAEDIC SURGERY

## 2019-02-27 PROCEDURE — 72100 X-RAY EXAM L-S SPINE 2/3 VWS: CPT | Performed by: ORTHOPAEDIC SURGERY

## 2019-02-27 PROCEDURE — 73502 X-RAY EXAM HIP UNI 2-3 VIEWS: CPT | Performed by: ORTHOPAEDIC SURGERY

## 2019-02-27 RX ORDER — TRAMADOL HYDROCHLORIDE 50 MG/1
50 TABLET ORAL EVERY 4 HOURS PRN
Qty: 30 TABLET | Refills: 0 | Status: SHIPPED | OUTPATIENT
Start: 2019-02-27 | End: 2020-12-21

## 2019-02-28 NOTE — PROGRESS NOTES
"Patient:Trudy Castano    YOB: 1932    Medical Record Number:[id number]    Chief Complaints:  Left hip and back pain    History of Present Illness:     86 y.o. female patient who presents for follow-up of her hip and back pain.  While the hip pain is significantly improved, her back pain has persisted.  She had epidurals previously which helped tremendously.  She denies any radicular symptoms down the leg.  Denies any weakness, numbness or bowel or bladder dysfunction.  She does mention that at times she feels like she \"can't control my feet\".  She denies that this is a progressive issue.    Allergies:No Known Allergies    Home Medications:    Current Outpatient Medications:   •  aspirin 81 MG tablet, Take 81 mg by mouth daily., Disp: , Rfl:   •  BYSTOLIC 2.5 MG tablet, TAKE 1 TABLET DAILY, Disp: 90 tablet, Rfl: 1  •  cholecalciferol (VITAMIN D3) 1000 UNITS tablet, Take 1,000 Units by mouth Daily., Disp: , Rfl:   •  latanoprost (XALATAN) 0.005 % ophthalmic solution, 1 drop Every Night., Disp: , Rfl:   •  metFORMIN ER (GLUCOPHAGE-XR) 500 MG 24 hr tablet, TAKE 1 TABLET BY MOUTH EVERY DAY WITH BREAKFAST FOR 1 WEEK, THEN TAKE 2 TABLETS BY MOUTH EVERY DAY, Disp: , Rfl:   •  montelukast (SINGULAIR) 10 MG tablet, Take 10 mg by mouth Every Night., Disp: , Rfl:   •  pravastatin (PRAVACHOL) 40 MG tablet, Take 40 mg by mouth Daily., Disp: , Rfl:     Past Medical History:   Diagnosis Date   • Arrhythmia    • Benign essential hypertension    • Broken bones    • Cancer (CMS/HCC)     skin cancer   • Chronic diastolic CHF (congestive heart failure) (CMS/HCC)    • COPD (chronic obstructive pulmonary disease) (CMS/HCC)    • Depression    • Fatigue    • Glaucoma    • Headache    • Hyperlipidemia    • Osteoporosis    • Palpitations    • Pneumonia    • Premature atrial complex    • Premature ventricular contractions    • Shortness of breath    • Sinus bradycardia    • Vasovagal syncope    • Weak pulse        Past " Surgical History:   Procedure Laterality Date   • BILATERAL BREAST REDUCTION     • CARDIAC CATHETERIZATION      OUTCOME: SUCCESSFUL   • CARDIAC CATHETERIZATION N/A 3/25/2016    Procedure: Left Heart Cath;  Surgeon: Phoebe Bowles MD;  Location: [facility] CATH INVASIVE LOCATION;  Service:    • CARDIAC CATHETERIZATION N/A 3/25/2016    Procedure: Left ventriculography;  Surgeon: Phoebe Bowles MD;  Location[facility] CATH [facility]VASIVE LOC[facility];  Service:    • CARDIAC CATHETERIZATION N/A 3/25/2016    Procedure: Coronary angiography;  Surgeon: Phoebe Bowles MD;  [facility] C[facility];  Service:    • CARDIAC ELECTROPHYSIOLOGY PROCEDURE N/A 2017    Procedure: Pacemaker DC new  [place];  Surgeon: Phoebe Bowles MD;  [facility];  Service:    • CERVICAL FUSION     • ECTOPIC PREGNANCY     • ELBOW DEBRIDEMENT     • EYE SURGERY     • HEMORRHOIDECTOMY     • HYSTERECTOMY     • INSERT / REPLACE / REMOVE PACEMAKER     • LUMBAR PUNCTURE      large volume lumbar puncture 35ml   • NECK SURGERY     • OOPHORECTOMY      FOR ECTOPIC PREGNANCY   • SPINAL FUSION  2012    C3-C4 Fusion Dr. Millard       Social History     Occupational History   • Not on file   Tobacco Use   • Smoking status: Former Smoker     Last attempt to quit: 3/1/1967     Years since quittin.0   • Smokeless tobacco: Never Used   Substance and Sexual Activity   • Alcohol use: No     Comment: SOCIAL   • Drug use: No   • Sexual activity: Defer      Social History     Social History Narrative   • Not on file       Family History   Problem Relation Age of Onset   • Heart disease Mother        Review of Systems:      Constitutional: Denies fever, shaking or chills   Eyes: Denies change in visual acuity   HEENT: Denies nasal congestion or sore throat   Respiratory: Denies cough or shortness of breath   Cardiovascular: Denies chest pain or edema  Endocrine: Denies tremors, palpitations, intolerance of  "heat or cold, polyuria, polydipsia.  GI: Denies abdominal pain, nausea, vomiting, bloody stools or diarrhea  : Denies frequency, urgency, incontinence, retention, or nocturia.  Musculoskeletal: Denies numbness, tingling or loss of motor function except as above  Integument: Denies rash, lesion or ulceration   Neurologic: Denies headache or focal weakness, deficits  Heme: Denies spontaneous or excessive bleeding, epistaxis, hematuria, melena, fatigue, enlarged or tender lymph nodes.      All other pertinent positives and negatives as noted above in HPI.    Physical Exam:86 y.o. female  Vitals:    02/27/19 1621   Temp: 97.2 °F (36.2 °C)   TempSrc: Temporal   Weight: 49.9 kg (110 lb)   Height: 152.4 cm (60\")       General:  Patient is awake and alert.  Appears in no acute distress or discomfort.    Psych:  Affect and demeanor are appropriate.    Extremities:  Left hip is examined.  Skin is benign.  No tenderness.  Good hip motion    Back: Skin is benign.  Her back is moderately tender over the mid and lower lumbar spine.  No step-offs.  Back motion is limited and uncomfortable.    Imaging:  AP and lateral views left hip are ordered and reviewed.  These are compared to previous x-rays.  No obvious abnormalities noted.  AP and lateral views of lumbar spine are ordered and reviewed.  She has degenerative spondylolisthesis at L3 4 and degenerative disc disease in this area as well.    Assessment/Plan:  Degenerative disc disease and lumbar spondylolisthesis with spinal stenosis    I told her that I recommend  a referral to Dr. Vallejo.  She tells me that she is not ready to consider surgical options and wants to get the epidurals repeated.  I have agreed to set her up for repeat epidurals.  She mentions that tramadol has worked well for her in the past.  She requested a repeat prescription for that.  I agreed to give her a limited prescription for tramadol to take as needed.  I told her to call me if she changes her mind " about the referral.  I also told her to call me if she notices any persistent or worsening symptoms.    Coy Cooper MD    02/27/2019

## 2019-03-13 ENCOUNTER — ANESTHESIA EVENT (OUTPATIENT)
Dept: PAIN MEDICINE | Facility: HOSPITAL | Age: 84
End: 2019-03-13

## 2019-03-13 ENCOUNTER — HOSPITAL ENCOUNTER (OUTPATIENT)
Dept: PAIN MEDICINE | Facility: HOSPITAL | Age: 84
Discharge: HOME | End: 2019-03-13
Admitting: ANESTHESIOLOGY

## 2019-03-13 ENCOUNTER — ANESTHESIA (OUTPATIENT)
Dept: PAIN MEDICINE | Facility: HOSPITAL | Age: 84
End: 2019-03-13

## 2019-03-13 ENCOUNTER — HOSPITAL ENCOUNTER (OUTPATIENT)
Dept: GENERAL RADIOLOGY | Facility: HOSPITAL | Age: 84
Discharge: HOME | End: 2019-03-13

## 2019-03-13 VITALS
BODY MASS INDEX: 21.6 KG/M2 | HEIGHT: 60 IN | HEART RATE: 78 BPM | DIASTOLIC BLOOD PRESSURE: 94 MMHG | SYSTOLIC BLOOD PRESSURE: 146 MMHG | OXYGEN SATURATION: 96 % | RESPIRATION RATE: 16 BRPM | TEMPERATURE: 98.3 F | WEIGHT: 110 LBS

## 2019-03-13 DIAGNOSIS — M54.16 LUMBAR RADICULOPATHY: Primary | ICD-10-CM

## 2019-03-13 DIAGNOSIS — R52 PAIN: ICD-10-CM

## 2019-03-13 LAB — GLUCOSE BLDC GLUCOMTR-MCNC: 114 MG/DL (ref 70–130)

## 2019-03-13 PROCEDURE — 82962 GLUCOSE BLOOD TEST: CPT

## 2019-03-13 PROCEDURE — 77003 FLUOROGUIDE FOR SPINE INJECT: CPT

## 2019-03-13 PROCEDURE — C1755 CATHETER, INTRASPINAL: HCPCS

## 2019-03-13 PROCEDURE — 0 IOPAMIDOL 41 % SOLUTION: Performed by: ANESTHESIOLOGY

## 2019-03-13 RX ORDER — MIDAZOLAM HYDROCHLORIDE 1 MG/ML
1 INJECTION INTRAMUSCULAR; INTRAVENOUS
Status: DISCONTINUED | OUTPATIENT
Start: 2019-03-13 | End: 2019-03-14 | Stop reason: HOSPADM

## 2019-03-13 RX ORDER — FENTANYL CITRATE 50 UG/ML
50 INJECTION, SOLUTION INTRAMUSCULAR; INTRAVENOUS AS NEEDED
Status: DISCONTINUED | OUTPATIENT
Start: 2019-03-13 | End: 2019-03-14 | Stop reason: HOSPADM

## 2019-03-13 RX ORDER — LIDOCAINE HYDROCHLORIDE 10 MG/ML
1 INJECTION, SOLUTION INFILTRATION; PERINEURAL ONCE
Status: DISCONTINUED | OUTPATIENT
Start: 2019-03-13 | End: 2019-03-14 | Stop reason: HOSPADM

## 2019-03-13 RX ORDER — METHYLPREDNISOLONE ACETATE 80 MG/ML
80 INJECTION, SUSPENSION INTRA-ARTICULAR; INTRALESIONAL; INTRAMUSCULAR; SOFT TISSUE ONCE
Status: DISCONTINUED | OUTPATIENT
Start: 2019-03-13 | End: 2019-03-14 | Stop reason: HOSPADM

## 2019-03-13 RX ADMIN — IOPAMIDOL 10 ML: 408 INJECTION, SOLUTION INTRATHECAL at 12:36

## 2019-03-13 NOTE — H&P
[facility]    History and Physical    Patient Name: Trudy Castano  :  10/12/1932  MRN:  [id number]  Date of Admission: 3/13/2019    Subjective     Patient is a 86 y.o. female presents with chief complaint of chronic, intermitent, moderate low back, hips: bilateral and leg: bilateral pain.  Onset of symptoms was gradual starting several months ago.  Symptoms are associated/aggravated by activity, exercise, lifting or sitting. Symptoms improve with rest    The following portions of the patients history were reviewed and updated as appropriate: current medications, allergies, past medical history, past surgical history, past family history, past social history and problem list                Objective     Past Medical History:   Past Medical History:   Diagnosis Date   • Arrhythmia    • Benign essential hypertension    • Broken bones    • Cancer (CMS/[facility])     skin cancer   • Chronic diastolic CHF (congestive heart failure) (CMS/[facility])    • COPD (chronic obstructive pulmonary disease) (CMS/[facility])    • Depression    • Fatigue    • Glaucoma    • Headache    • Hyperlipidemia    • Low back pain    • Osteoporosis    • Palpitations    • Pneumonia    • Premature atrial complex    • Premature ventricular contractions    • Shortness of breath    • Sinus bradycardia    • Vasovagal syncope    • Weak pulse      Past Surgical History:   Past Surgical History:   Procedure Laterality Date   • BILATERAL BREAST REDUCTION     • CARDIAC CATHETERIZATION      OUTCOME: SUCCESSFUL   • CARDIAC CATHETERIZATION N/A 3/25/2016    Procedure: Left Heart Cath;  Surgeon: Phoebe Bowles MD;  Location: [facility] INVASIVE LOCATION;  Service:    • CARDIAC CATHETERIZATION N/A 3/25/2016    Procedure: Left ventriculography;  Surgeon: Phoeeb Bowles MD;  Location: [facility] INVASIVE LOCATION;  Service:    • CARDIAC CATHETERIZATION N/A 3/25/2016    Procedure: Coronary angiography;  Surgeon: Phoebe Bowles MD;  Location: [facility]  "CATH INVASIVE LOCATION;  Service:    • CARDIAC ELECTROPHYSIOLOGY PROCEDURE N/A 2017    Procedure: Pacemaker DC new  [place];  Surgeon: Phoebe Bowles MD;  Location: [facility] CATH INVASIVE LOCATION;  Service:    • CATARACT EXTRACTION, BILATERAL     • CERVICAL FUSION     • ECTOPIC PREGNANCY     • ELBOW DEBRIDEMENT     • EYE SURGERY     • HEMORRHOIDECTOMY     • HYSTERECTOMY     • INSERT / REPLACE / REMOVE PACEMAKER     • LUMBAR PUNCTURE      large volume lumbar puncture 35ml   • NECK SURGERY     • OOPHORECTOMY      FOR ECTOPIC PREGNANCY   • SPINAL FUSION  2012    C3-C4 Fusion Dr. Millard     Family History:   Family History   Problem Relation Age of Onset   • Heart disease Mother      Social History:   Social History     Tobacco Use   • Smoking status: Former Smoker     Last attempt to quit: 3/1/1967     Years since quittin.0   • Smokeless tobacco: Never Used   Substance Use Topics   • Alcohol use: No     Comment: SOCIAL   • Drug use: No       Vital Signs Range for the last 24 hours  Temperature: Temp:  [36.8 °C (98.3 °F)] 36.8 °C (98.3 °F)   Temp Source: Temp src: Oral   BP: BP: (137)/(64) 137/64   Pulse: Heart Rate:  [92] 92   Respirations: Resp:  [16] 16   SPO2: SpO2:  [92 %] 92 %   O2 Amount (l/min):     O2 Devices Device (Oxygen Therapy): room air   Weight: Weight:  [49.9 kg (110 lb)] 49.9 kg (110 lb)     Flowsheet Rows      First Filed Value   Admission Height  152.4 cm (60\") Documented at 2019 1144   Admission Weight  49.9 kg (110 lb) Documented at 2019 1144          --------------------------------------------------------------------------------    Current Outpatient Medications   Medication Sig Dispense Refill   • aspirin 81 MG tablet Take 81 mg by mouth daily.     • ATORVASTATIN CALCIUM PO Take  by mouth Daily. Patient is unsure of dose      • BYSTOLIC 2.5 MG tablet TAKE 1 TABLET DAILY 90 tablet 1   • cholecalciferol (VITAMIN D3) 1000 UNITS tablet Take 1,000 Units by mouth Daily.   "   • latanoprost (XALATAN) 0.005 % ophthalmic solution 1 drop Every Night.     • metFORMIN ER (GLUCOPHAGE-XR) 500 MG 24 hr tablet TAKE 1 TABLET BY MOUTH EVERY DAY WITH BREAKFAST FOR 1 WEEK, THEN TAKE 2 TABLETS BY MOUTH EVERY DAY     • montelukast (SINGULAIR) 10 MG tablet Take 10 mg by mouth Every Night.     • traMADol (ULTRAM) 50 MG tablet Take 1 tablet by mouth Every 4 (Four) Hours As Needed for Moderate Pain . 30 tablet 0     No current facility-administered medications for this encounter.        --------------------------------------------------------------------------------  Assessment/Plan      Anesthesia Evaluation     Patient summary reviewed and Nursing notes reviewed         Pain impairs ability to perform ADLs: Sleeping  Modalities previously tried to control pain with limited effectiveness within the last 4-6 weeks: Rest     Airway   Mallampati: II  TM distance: >3 FB  Neck ROM: full  Dental - normal exam     Pulmonary - normal exam   (+) pneumonia resolved , COPD, asthma, shortness of breath,   Cardiovascular - normal exam    (+) hypertension, CAD, CHF, hyperlipidemia,       Neuro/Psych- neuro exam normal  (+) headaches, syncope, psychiatric history Depression,     GI/Hepatic/Renal/Endo    (+)   diabetes mellitus,     Musculoskeletal (-) negative ROS and normal exam    Abdominal  - normal exam   Substance History - negative use     OB/GYN negative ob/gyn ROS         Other      history of cancer               Diagnosis and Plan    Treatment Plan  ASA 3   Patient has had previous injection/procedure with 25-50% improvement.   Procedures: Lumbar Epidural Steroid Injection(LESI), With fluoroscopy,       Anesthetic plan and risks discussed with patient.          Diagnosis     * Lumbar radiculopathy [M54.16]

## 2019-03-13 NOTE — ANESTHESIA PROCEDURE NOTES
PAIN Epidural block      Patient reassessed immediately prior to procedure    Patient location during procedure: pain clinic  Indication:procedure for pain  Performed By  Anesthesiologist: Payam Holland MD  Preanesthetic Checklist  Completed: patient identified, site marked, surgical consent, pre-op evaluation, timeout performed, risks and benefits discussed and monitors and equipment checked  Additional Notes  Depomedrol - 80mg    Needle position confirmed by fluoroscopy and epidurogram using 2cc of [id number].    Diagnosis  Post-Op Diagnosis Codes:     * Lumbar radiculopathy (M54.16)    Prep:  Pt Position:prone  Sterile Tech:cap, gloves, mask and sterile barrier  Prep:chlorhexidine gluconate and isopropyl alcohol  Monitoring:blood pressure monitoring, continuous pulse oximetry and EKG  Procedure:Sedation: no     Approach:left paramedian  Guidance: fluoroscopy  Location:lumbar  Level:4-5  Needle Type:Tuohy  Needle Gauge:20  Aspiration:negative  Medications:  Depomedrol:80 mg  Preservative Free Saline:2mL  Isovue:2mL    Post Assessment:  Post-procedure: bandaide.  Pt Tolerance:patient tolerated the procedure well with no apparent complications  Complications:no

## 2019-04-18 ENCOUNTER — CLINICAL SUPPORT NO REQUIREMENTS (OUTPATIENT)
Dept: CARDIOLOGY | Facility: CLINIC | Age: 84
End: 2019-04-18

## 2019-04-18 DIAGNOSIS — Z95.0 PACEMAKER: Primary | ICD-10-CM

## 2019-04-18 PROCEDURE — 93288 INTERROG EVL PM/LDLS PM IP: CPT | Performed by: INTERNAL MEDICINE

## 2019-04-29 ENCOUNTER — OFFICE VISIT (OUTPATIENT)
Dept: CARDIOLOGY | Facility: CLINIC | Age: 84
End: 2019-04-29

## 2019-04-29 VITALS
SYSTOLIC BLOOD PRESSURE: 150 MMHG | BODY MASS INDEX: 21.2 KG/M2 | DIASTOLIC BLOOD PRESSURE: 82 MMHG | HEIGHT: 60 IN | WEIGHT: 108 LBS | HEART RATE: 77 BPM

## 2019-04-29 DIAGNOSIS — I49.3 VENTRICULAR PREMATURE BEATS: Primary | ICD-10-CM

## 2019-04-29 DIAGNOSIS — I25.10 CORONARY ARTERY DISEASE INVOLVING NATIVE CORONARY ARTERY OF NATIVE HEART WITHOUT ANGINA PECTORIS: ICD-10-CM

## 2019-04-29 DIAGNOSIS — Z95.0 PACEMAKER: ICD-10-CM

## 2019-04-29 DIAGNOSIS — R00.2 PALPITATIONS: ICD-10-CM

## 2019-04-29 PROCEDURE — 99213 OFFICE O/P EST LOW 20 MIN: CPT | Performed by: INTERNAL MEDICINE

## 2019-04-29 PROCEDURE — 93000 ELECTROCARDIOGRAM COMPLETE: CPT | Performed by: INTERNAL MEDICINE

## 2019-05-02 RX ORDER — NEBIVOLOL HYDROCHLORIDE 2.5 MG/1
TABLET ORAL
Qty: 90 TABLET | Refills: 1 | Status: SHIPPED | OUTPATIENT
Start: 2019-05-02 | End: 2020-06-22 | Stop reason: SDUPTHER

## 2019-06-06 ENCOUNTER — OFFICE VISIT (OUTPATIENT)
Dept: ORTHOPEDIC SURGERY | Facility: CLINIC | Age: 84
End: 2019-06-06

## 2019-06-06 DIAGNOSIS — M25.521 RIGHT ELBOW PAIN: Primary | ICD-10-CM

## 2019-06-06 DIAGNOSIS — M19.90 ARTHRITIS: ICD-10-CM

## 2019-06-06 PROCEDURE — 73070 X-RAY EXAM OF ELBOW: CPT | Performed by: ORTHOPAEDIC SURGERY

## 2019-06-06 PROCEDURE — 99213 OFFICE O/P EST LOW 20 MIN: CPT | Performed by: ORTHOPAEDIC SURGERY

## 2019-06-06 PROCEDURE — 20605 DRAIN/INJ JOINT/BURSA W/O US: CPT | Performed by: ORTHOPAEDIC SURGERY

## 2019-06-06 RX ORDER — ACETAMINOPHEN AND CODEINE PHOSPHATE 300; 30 MG/1; MG/1
1 TABLET ORAL EVERY 8 HOURS PRN
Qty: 20 TABLET | Refills: 0 | Status: SHIPPED | OUTPATIENT
Start: 2019-06-06 | End: 2020-05-06

## 2019-06-06 RX ORDER — MELOXICAM 7.5 MG/1
TABLET ORAL
Qty: 90 TABLET | Refills: 3 | Status: SHIPPED | OUTPATIENT
Start: 2019-06-06 | End: 2019-06-06 | Stop reason: ALTCHOICE

## 2019-06-06 RX ORDER — MELOXICAM 7.5 MG/1
TABLET ORAL
Qty: 30 TABLET | Refills: 0 | Status: SHIPPED | OUTPATIENT
Start: 2019-06-06 | End: 2020-04-30 | Stop reason: HOSPADM

## 2019-06-06 RX ORDER — ACETAMINOPHEN AND CODEINE PHOSPHATE 300; 30 MG/1; MG/1
1 TABLET ORAL EVERY 8 HOURS PRN
Qty: 45 TABLET | Refills: 0 | Status: SHIPPED | OUTPATIENT
Start: 2019-06-06 | End: 2020-05-06

## 2019-06-06 RX ADMIN — LIDOCAINE HYDROCHLORIDE 2 ML: 10 INJECTION, SOLUTION EPIDURAL; INFILTRATION; INTRACAUDAL; PERINEURAL at 16:02

## 2019-06-06 RX ADMIN — METHYLPREDNISOLONE ACETATE 80 MG: 80 INJECTION, SUSPENSION INTRA-ARTICULAR; INTRALESIONAL; INTRAMUSCULAR; SOFT TISSUE at 16:02

## 2019-06-06 NOTE — PROGRESS NOTES
New Right Elbow      Patient: Trudy Castano        YOB: 1932        Chief Complaints: Right Elbow pain      History of Present Illness:  This is a  86 y.o. female who presents complaining of right arm pain he states it is in the upper arm elbow and down to the wrist she has limitation of her motion s  Her current symptoms are severe constant aching swelling no new history injury change in activity no redness swelling signs or symptoms of infection she gets mild improvement with rest she is retired past medical history smart for prediabetes osteoporosis and asthma  Chief Complaint   Patient presents with   • Right Elbow - Establish Care             Allergies: No Known Allergies    Medications:   Home Medications:  Current Outpatient Medications on File Prior to Visit   Medication Sig   • aspirin 81 MG tablet Take 81 mg by mouth daily.   • ATORVASTATIN CALCIUM PO Take  by mouth Daily. Patient is unsure of dose    • BYSTOLIC 2.5 MG tablet TAKE 1 TABLET DAILY   • cholecalciferol (VITAMIN D3) 1000 UNITS tablet Take 1,000 Units by mouth Daily.   • latanoprost (XALATAN) 0.005 % ophthalmic solution 1 drop Every Night.   • metFORMIN ER (GLUCOPHAGE-XR) 500 MG 24 hr tablet TAKE 1 TABLET BY MOUTH EVERY DAY WITH BREAKFAST FOR 1 WEEK, THEN TAKE 2 TABLETS BY MOUTH EVERY DAY   • montelukast (SINGULAIR) 10 MG tablet Take 10 mg by mouth Every Night.   • traMADol (ULTRAM) 50 MG tablet Take 1 tablet by mouth Every 4 (Four) Hours As Needed for Moderate Pain .     No current facility-administered medications on file prior to visit.      Current Medications:  Scheduled Meds:  Continuous Infusions:  No current facility-administered medications for this visit.   PRN Meds:.    Past Medical History:   Diagnosis Date   • Arrhythmia    • Benign essential hypertension    • Broken bones    • Cancer (CMS/[facility])     skin cancer   • Chronic diastolic CHF (congestive heart failure) (CMS/[facility])    • COPD (chronic obstructive pulmonary  disease) (CMS/[facility])    • Depression    • Fatigue    • Glaucoma    • Headache    • Hyperlipidemia    • Low back pain    • Osteoporosis    • Palpitations    • Pneumonia    • Premature atrial complex    • Premature ventricular contractions    • Shortness of breath    • Sinus bradycardia    • Vasovagal syncope    • Weak pulse         Past Surgical History:   Procedure Laterality Date   • BILATERAL BREAST REDUCTION     • CARDIAC CATHETERIZATION      OUTCOME: SUCCESSFUL   • CARDIAC CATHETERIZATION N/A 3/25/2016    Procedure: Left Heart Cath;  Surgeon: Phoebe Bowles MD;  Location: [facility] CATH INVASIVE LOCATION;  Service:    • CARDIAC CATHETERIZATION N/A 3/25/2016    Procedure: Left ventriculography;  Surgeon: Phoebe Bowles MD;  [facility];  Service:    • CARDIAC CATHETERIZATION N/A 3/25/2016    Procedure: Coronary angiography;  Surgeon: Phoebe Bowles MD;  [facility];  Service:    • CARDIAC ELECTROPHYSIOLOGY PROCEDURE N/A 2017    Procedure: Pacemaker DC new  [place];  Surgeon: Phoebe Bowles MD;  Location: [facility] CATH INVASIVE LOCATION;  Service:    • CATARACT EXTRACTION, BILATERAL     • CERVICAL FUSION     • ECTOPIC PREGNANCY     • ELBOW DEBRIDEMENT     • EYE SURGERY     • HEMORRHOIDECTOMY     • HYSTERECTOMY     • INSERT / REPLACE / REMOVE PACEMAKER     • LUMBAR PUNCTURE      large volume lumbar puncture 35ml   • NECK SURGERY     • OOPHORECTOMY      FOR ECTOPIC PREGNANCY   • SPINAL FUSION  2012    C3-C4 Fusion Dr. Millard        Social History     Occupational History   • Not on file   Tobacco Use   • Smoking status: Former Smoker     Last attempt to quit: 3/1/1967     Years since quittin.3   • Smokeless tobacco: Never Used   Substance and Sexual Activity   • Alcohol use: No     Comment: SOCIAL   • Drug use: No   • Sexual activity: Defer    Social History     Social History Narrative   • Not on file        Family History    Problem Relation Age of Onset   • Heart disease Mother              Review of Systems: 14 point review of systems are remarkable for the multiple pertinent positives listed in the chart by the patient the remainder negative  Review of Systems      Physical Exam: 86 y.o. female  General Appearance:    Alert, cooperative, in no acute distress                 There were no vitals filed for this visit.   Patient is alert and read ×3 no acute distress appears her above-listed at height weight and age.  Affect is normal respiratory rate is normal unlabored. Heart rate regular rate rhythm, sclera, dentition and hearing are normal for the purpose of this exam.        Ortho Exam's exam the right elbow she does have a healed lateral incision she has -15 degrees of extension she can flex to 105 degrees she has about 60% of pronation supination she does have pain in range of all of the above there is no redness or signs or symptoms of infection           Radiology:   AP, Lateral of the right elbow were ordered/reviewed to evaluate pain.  Have no compared to films she has marked degenerative changes with narrowing of the joint space and osteophyte formation    Assessment/Plan:  Right elbow pain with degenerative changes I think she benefit from an injection we talked about ice limitation of her activity I will see her back in 1 month    Medium Joint Arthrocentesis: R elbow  Date/Time: 6/6/2019 4:02 PM  Consent given by: patient  Site marked: site marked  Timeout: Immediately prior to procedure a time out was called to verify the correct patient, procedure, equipment, support staff and site/side marked as required   Supporting Documentation  Indications: pain   Procedure Details  Location: elbow - R elbow  Preparation: Patient was prepped and draped in the usual sterile fashion  Needle gauge: 21.  Medications administered: 80 mg methylPREDNISolone acetate 80 MG/ML; 2 mL lidocaine PF 1% 1 %  Patient tolerance: patient tolerated  the procedure well with no immediate complications

## 2019-06-09 RX ORDER — METHYLPREDNISOLONE ACETATE 80 MG/ML
80 INJECTION, SUSPENSION INTRA-ARTICULAR; INTRALESIONAL; INTRAMUSCULAR; SOFT TISSUE
Status: COMPLETED | OUTPATIENT
Start: 2019-06-06 | End: 2019-06-06

## 2019-06-09 RX ORDER — LIDOCAINE HYDROCHLORIDE 10 MG/ML
2 INJECTION, SOLUTION EPIDURAL; INFILTRATION; INTRACAUDAL; PERINEURAL
Status: COMPLETED | OUTPATIENT
Start: 2019-06-06 | End: 2019-06-06

## 2019-07-12 ENCOUNTER — CLINICAL SUPPORT NO REQUIREMENTS (OUTPATIENT)
Dept: CARDIOLOGY | Facility: CLINIC | Age: 84
End: 2019-07-12

## 2019-07-12 DIAGNOSIS — Z95.0 PACEMAKER: Primary | ICD-10-CM

## 2019-07-12 PROCEDURE — 93294 REM INTERROG EVL PM/LDLS PM: CPT | Performed by: INTERNAL MEDICINE

## 2019-07-12 PROCEDURE — 93296 REM INTERROG EVL PM/IDS: CPT | Performed by: INTERNAL MEDICINE

## 2019-08-28 ENCOUNTER — HOSPITAL ENCOUNTER (EMERGENCY)
Facility: HOSPITAL | Age: 84
Discharge: HOME | End: 2019-08-29
Attending: EMERGENCY MEDICINE | Admitting: EMERGENCY MEDICINE

## 2019-08-28 ENCOUNTER — APPOINTMENT (OUTPATIENT)
Dept: GENERAL RADIOLOGY | Facility: HOSPITAL | Age: 84
End: 2019-08-28

## 2019-08-28 DIAGNOSIS — R53.1 GENERALIZED WEAKNESS: Primary | ICD-10-CM

## 2019-08-28 LAB
ALBUMIN SERPL-MCNC: 4.7 G/DL (ref 3.5–5.2)
ALBUMIN/GLOB SERPL: 2.1 G/DL
ALP SERPL-CCNC: 83 U/L (ref 39–117)
ALT SERPL W P-5'-P-CCNC: 8 U/L (ref 1–33)
ANION GAP SERPL CALCULATED.3IONS-SCNC: 13.9 MMOL/L (ref 5–15)
AST SERPL-CCNC: 18 U/L (ref 1–32)
BASOPHILS # BLD AUTO: 0.05 10*3/MM3 (ref 0–0.2)
BASOPHILS NFR BLD AUTO: 0.5 % (ref 0–1.5)
BILIRUB SERPL-MCNC: 0.5 MG/DL (ref 0.2–1.2)
BILIRUB UR QL STRIP: NEGATIVE
BUN BLD-MCNC: 18 MG/DL (ref 8–23)
BUN/CREAT SERPL: 29 (ref 7–25)
CALCIUM SPEC-SCNC: 9.8 MG/DL (ref 8.6–10.5)
CHLORIDE SERPL-SCNC: 100 MMOL/L (ref 98–107)
CLARITY UR: CLEAR
CO2 SERPL-SCNC: 25.1 MMOL/L (ref 22–29)
COLOR UR: YELLOW
CREAT BLD-MCNC: 0.62 MG/DL (ref 0.57–1)
D DIMER PPP FEU-MCNC: 0.45 MCGFEU/ML (ref 0–0.49)
DEPRECATED RDW RBC AUTO: 45.5 FL (ref 37–54)
EOSINOPHIL # BLD AUTO: 0.01 10*3/MM3 (ref 0–0.4)
EOSINOPHIL NFR BLD AUTO: 0.1 % (ref 0.3–6.2)
ERYTHROCYTE [DISTWIDTH] IN BLOOD BY AUTOMATED COUNT: 13.2 % (ref 12.3–15.4)
GFR SERPL CREATININE-BSD FRML MDRD: 91 ML/MIN/1.73
GLOBULIN UR ELPH-MCNC: 2.2 GM/DL
GLUCOSE BLD-MCNC: 132 MG/DL (ref 65–99)
GLUCOSE UR STRIP-MCNC: NEGATIVE MG/DL
HCT VFR BLD AUTO: 36.9 % (ref 34–46.6)
HGB BLD-MCNC: 12.1 G/DL (ref 12–15.9)
HGB UR QL STRIP.AUTO: NEGATIVE
IMM GRANULOCYTES # BLD AUTO: 0.04 10*3/MM3 (ref 0–0.05)
IMM GRANULOCYTES NFR BLD AUTO: 0.4 % (ref 0–0.5)
KETONES UR QL STRIP: ABNORMAL
LEUKOCYTE ESTERASE UR QL STRIP.AUTO: NEGATIVE
LIPASE SERPL-CCNC: 35 U/L (ref 13–60)
LYMPHOCYTES # BLD AUTO: 0.79 10*3/MM3 (ref 0.7–3.1)
LYMPHOCYTES NFR BLD AUTO: 7.4 % (ref 19.6–45.3)
MAGNESIUM SERPL-MCNC: 1.9 MG/DL (ref 1.6–2.4)
MCH RBC QN AUTO: 31.2 PG (ref 26.6–33)
MCHC RBC AUTO-ENTMCNC: 32.8 G/DL (ref 31.5–35.7)
MCV RBC AUTO: 95.1 FL (ref 79–97)
MONOCYTES # BLD AUTO: 0.36 10*3/MM3 (ref 0.1–0.9)
MONOCYTES NFR BLD AUTO: 3.4 % (ref 5–12)
NEUTROPHILS # BLD AUTO: 9.46 10*3/MM3 (ref 1.7–7)
NEUTROPHILS NFR BLD AUTO: 88.2 % (ref 42.7–76)
NITRITE UR QL STRIP: NEGATIVE
NRBC BLD AUTO-RTO: 0 /100 WBC (ref 0–0.2)
PH UR STRIP.AUTO: 5.5 [PH] (ref 5–8)
PLATELET # BLD AUTO: 247 10*3/MM3 (ref 140–450)
PMV BLD AUTO: 10.6 FL (ref 6–12)
POTASSIUM BLD-SCNC: 4.9 MMOL/L (ref 3.5–5.2)
PROT SERPL-MCNC: 6.9 G/DL (ref 6–8.5)
PROT UR QL STRIP: NEGATIVE
RBC # BLD AUTO: 3.88 10*6/MM3 (ref 3.77–5.28)
SODIUM BLD-SCNC: 139 MMOL/L (ref 136–145)
SP GR UR STRIP: 1.02 (ref 1–1.03)
UROBILINOGEN UR QL STRIP: ABNORMAL
WBC NRBC COR # BLD: 10.71 10*3/MM3 (ref 3.4–10.8)

## 2019-08-28 PROCEDURE — 81003 URINALYSIS AUTO W/O SCOPE: CPT | Performed by: NURSE PRACTITIONER

## 2019-08-28 PROCEDURE — 71045 X-RAY EXAM CHEST 1 VIEW: CPT

## 2019-08-28 PROCEDURE — 85379 FIBRIN DEGRADATION QUANT: CPT | Performed by: NURSE PRACTITIONER

## 2019-08-28 PROCEDURE — 93010 ELECTROCARDIOGRAM REPORT: CPT | Performed by: INTERNAL MEDICINE

## 2019-08-28 PROCEDURE — 80053 COMPREHEN METABOLIC PANEL: CPT | Performed by: NURSE PRACTITIONER

## 2019-08-28 PROCEDURE — 83735 ASSAY OF MAGNESIUM: CPT | Performed by: NURSE PRACTITIONER

## 2019-08-28 PROCEDURE — 85025 COMPLETE CBC W/AUTO DIFF WBC: CPT | Performed by: NURSE PRACTITIONER

## 2019-08-28 PROCEDURE — 83880 ASSAY OF NATRIURETIC PEPTIDE: CPT | Performed by: NURSE PRACTITIONER

## 2019-08-28 PROCEDURE — 93005 ELECTROCARDIOGRAM TRACING: CPT

## 2019-08-28 PROCEDURE — 99284 EMERGENCY DEPT VISIT MOD MDM: CPT

## 2019-08-28 PROCEDURE — 93005 ELECTROCARDIOGRAM TRACING: CPT | Performed by: EMERGENCY MEDICINE

## 2019-08-28 PROCEDURE — 83690 ASSAY OF LIPASE: CPT | Performed by: NURSE PRACTITIONER

## 2019-08-28 PROCEDURE — 84484 ASSAY OF TROPONIN QUANT: CPT | Performed by: NURSE PRACTITIONER

## 2019-08-28 RX ORDER — SODIUM CHLORIDE 0.9 % (FLUSH) 0.9 %
10 SYRINGE (ML) INJECTION AS NEEDED
Status: DISCONTINUED | OUTPATIENT
Start: 2019-08-28 | End: 2019-08-29 | Stop reason: HOSPADM

## 2019-08-29 VITALS
HEART RATE: 98 BPM | OXYGEN SATURATION: 98 % | HEIGHT: 60 IN | RESPIRATION RATE: 18 BRPM | DIASTOLIC BLOOD PRESSURE: 80 MMHG | BODY MASS INDEX: 21.2 KG/M2 | TEMPERATURE: 96.7 F | SYSTOLIC BLOOD PRESSURE: 148 MMHG | WEIGHT: 108 LBS

## 2019-08-29 LAB
NT-PROBNP SERPL-MCNC: 1184 PG/ML (ref 5–1800)
TROPONIN T SERPL-MCNC: <0.01 NG/ML (ref 0–0.03)

## 2019-09-05 ENCOUNTER — ANESTHESIA (OUTPATIENT)
Dept: PAIN MEDICINE | Facility: HOSPITAL | Age: 84
End: 2019-09-05

## 2019-09-05 ENCOUNTER — HOSPITAL ENCOUNTER (OUTPATIENT)
Dept: GENERAL RADIOLOGY | Facility: HOSPITAL | Age: 84
Discharge: HOME | End: 2019-09-05

## 2019-09-05 ENCOUNTER — HOSPITAL ENCOUNTER (OUTPATIENT)
Dept: PAIN MEDICINE | Facility: HOSPITAL | Age: 84
Discharge: HOME | End: 2019-09-05
Admitting: ANESTHESIOLOGY

## 2019-09-05 ENCOUNTER — ANESTHESIA EVENT (OUTPATIENT)
Dept: PAIN MEDICINE | Facility: HOSPITAL | Age: 84
End: 2019-09-05

## 2019-09-05 VITALS
OXYGEN SATURATION: 95 % | SYSTOLIC BLOOD PRESSURE: 156 MMHG | HEART RATE: 82 BPM | TEMPERATURE: 98.4 F | DIASTOLIC BLOOD PRESSURE: 58 MMHG | RESPIRATION RATE: 16 BRPM

## 2019-09-05 DIAGNOSIS — M47.816 SPONDYLOSIS OF LUMBAR REGION WITHOUT MYELOPATHY OR RADICULOPATHY: ICD-10-CM

## 2019-09-05 DIAGNOSIS — M51.36 DDD (DEGENERATIVE DISC DISEASE), LUMBAR: Primary | ICD-10-CM

## 2019-09-05 DIAGNOSIS — R52 PAIN: ICD-10-CM

## 2019-09-05 PROCEDURE — C1755 CATHETER, INTRASPINAL: HCPCS

## 2019-09-05 PROCEDURE — 25010000002 METHYLPREDNISOLONE PER 80 MG: Performed by: ANESTHESIOLOGY

## 2019-09-05 PROCEDURE — 0 IOPAMIDOL 41 % SOLUTION: Performed by: ANESTHESIOLOGY

## 2019-09-05 PROCEDURE — 77003 FLUOROGUIDE FOR SPINE INJECT: CPT

## 2019-09-05 RX ORDER — LIDOCAINE HYDROCHLORIDE 10 MG/ML
1 INJECTION, SOLUTION INFILTRATION; PERINEURAL ONCE
Status: DISCONTINUED | OUTPATIENT
Start: 2019-09-05 | End: 2019-09-06 | Stop reason: HOSPADM

## 2019-09-05 RX ORDER — MIDAZOLAM HYDROCHLORIDE 1 MG/ML
1 INJECTION INTRAMUSCULAR; INTRAVENOUS AS NEEDED
Status: DISCONTINUED | OUTPATIENT
Start: 2019-09-05 | End: 2019-09-06 | Stop reason: HOSPADM

## 2019-09-05 RX ORDER — SODIUM CHLORIDE 0.9 % (FLUSH) 0.9 %
3 SYRINGE (ML) INJECTION EVERY 12 HOURS SCHEDULED
Status: DISCONTINUED | OUTPATIENT
Start: 2019-09-05 | End: 2019-09-06 | Stop reason: HOSPADM

## 2019-09-05 RX ORDER — METHYLPREDNISOLONE ACETATE 80 MG/ML
80 INJECTION, SUSPENSION INTRA-ARTICULAR; INTRALESIONAL; INTRAMUSCULAR; SOFT TISSUE ONCE
Status: COMPLETED | OUTPATIENT
Start: 2019-09-05 | End: 2019-09-05

## 2019-09-05 RX ORDER — SODIUM CHLORIDE 0.9 % (FLUSH) 0.9 %
3-10 SYRINGE (ML) INJECTION AS NEEDED
Status: DISCONTINUED | OUTPATIENT
Start: 2019-09-05 | End: 2019-09-06 | Stop reason: HOSPADM

## 2019-09-05 RX ORDER — FENTANYL CITRATE 50 UG/ML
50 INJECTION, SOLUTION INTRAMUSCULAR; INTRAVENOUS AS NEEDED
Status: DISCONTINUED | OUTPATIENT
Start: 2019-09-05 | End: 2019-09-06 | Stop reason: HOSPADM

## 2019-09-05 RX ADMIN — IOPAMIDOL 10 ML: 408 INJECTION, SOLUTION INTRATHECAL at 13:35

## 2019-09-05 RX ADMIN — METHYLPREDNISOLONE ACETATE 80 MG: 80 INJECTION, SUSPENSION INTRA-ARTICULAR; INTRALESIONAL; INTRAMUSCULAR; SOFT TISSUE at 13:35

## 2019-09-05 NOTE — H&P
CHIEF COMPLAINT:  Low back pain        HISTORY OF PRESENT ILLNESS:  This patient is complaining of low back pain which previously radiate down bilateral legs.  She states that she is no longer having pain in her legs.  She has had 2 prior epidural steroid injections, the most recent of which was in March 2019.  The pain is significantly decreasing the patient's Activities of Daily Living as it has returned somewhat recently.  Diagnostic imaging was reviewed.    She is requesting another lumbar epidural steroid injection because they have so greatly improved her pain and activities of daily living in the past.     PAST MEDICAL HISTORY:  Current Outpatient Medications on File Prior to Encounter   Medication Sig Dispense Refill   • ATORVASTATIN CALCIUM PO Take  by mouth Daily. Patient is unsure of dose      • BYSTOLIC 2.5 MG tablet TAKE 1 TABLET DAILY 90 tablet 1   • cholecalciferol (VITAMIN D3) 1000 UNITS tablet Take 1,000 Units by mouth Daily.     • latanoprost (XALATAN) 0.005 % ophthalmic solution 1 drop Every Night.     • meloxicam (MOBIC) 7.5 MG tablet 1 Oral Daily with food. 30 tablet 0   • metFORMIN ER (GLUCOPHAGE-XR) 500 MG 24 hr tablet TAKE 1 TABLET BY MOUTH EVERY DAY WITH BREAKFAST FOR 1 WEEK, THEN TAKE 2 TABLETS BY MOUTH EVERY DAY     • montelukast (SINGULAIR) 10 MG tablet Take 10 mg by mouth Every Night.     • traMADol (ULTRAM) 50 MG tablet Take 1 tablet by mouth Every 4 (Four) Hours As Needed for Moderate Pain . 30 tablet 0   • acetaminophen-codeine (TYLENOL #3) 300-30 MG per tablet Take 1 tablet by mouth Every 8 (Eight) Hours As Needed for Moderate Pain . 45 tablet 0   • acetaminophen-codeine (TYLENOL #3) 300-30 MG per tablet Take 1 tablet by mouth Every 8 (Eight) Hours As Needed for Moderate Pain . 20 tablet 0   • aspirin 81 MG tablet Take 81 mg by mouth daily.       No current facility-administered medications on file prior to encounter.        Past Medical History:   Diagnosis Date   • Arrhythmia    •  Benign essential hypertension    • Broken bones    • Cancer (CMS/[facility])     skin cancer   • Chronic diastolic CHF (congestive heart failure) (CMS/[facility])    • COPD (chronic obstructive pulmonary disease) (CMS/[facility])    • Depression    • Fatigue    • Glaucoma    • Headache    • Hyperlipidemia    • Low back pain    • Osteoporosis    • Palpitations    • Pneumonia    • Premature atrial complex    • Premature ventricular contractions    • Shortness of breath    • Sinus bradycardia    • Vasovagal syncope    • Weak pulse        SOCIAL HISTORY:  Counseled to avoid tobacco     REVIEW OF SYSTEMS:  No pertinent hematologic, infectious, or constitutional symptoms.  Other review of systems non-contributory     PHYSICAL EXAM:  BP (!) 139/106 (BP Location: Left arm, Patient Position: Lying)   Pulse 77   Temp 36.9 °C (98.4 °F) (Oral)   Resp 16   SpO2 96%   Constitutional: Well-developed well-nourished no acute distress  General: Alert and oriented  Ophtho: EOMI  Airway: Mallampati 2  Cardiac:  Regular rate  Lungs:  Clear to auscultation bilaterally   GI: soft, nontender  Cervical Spine: Noncontributory  Sacroiliac Joints: Noncontributory  Musc: She has some tenderness to palpation over the lumbar facets and pain with facet loading.  She also has pain with extreme lumbar flexion.  Neuro: Straight leg raise test is negative     DIAGNOSIS:  Post-Op Diagnosis Codes:  Post-Op Diagnosis Codes:     * DDD (degenerative disc disease), lumbar [M51.36]     * Lumbar spondylosis [M47.816]     PLAN:  -  Lumbar epidural steroid injections at the planned level of L4-L5.  If pain control is acceptable after this injection, it was discussed with the patient that they may return for the subsequent injections if and when their pain returns.    We will perform future number epidural steroid injections as necessary.  - Risks were discussed with the patient, including but not limited to: failure of relief, worsening pain, tissue, nerve, blood vessel or  spinal cord damage, post-dural-puncture headache, bleeding, epidural hematoma, infection, and epidural abscess. Benefits and alternatives were also discussed. No promises were made. The patient voiced understanding.  -  Physical therapy exercises at home should be considered, as tolerated, as prescribed by physical therapy or from the pain clinic handout (given to the patient).  Continuation of these exercises every day, or multiple times per week, even when the patient has good pain relief, was stressed to the patient as a preventative measure to decrease the frequency and severity of future pain episodes.  -  It is recommended that the patient use the least amount of opioid medication possible.     -  Heat and ice to the affected area as tolerated for pain control.  It was discussed that heating pads can cause burns.  -  Daily low impact exercise such as walking or water exercise was recommended to maintain overall health and aid in weight control.   -  Patient was counseled to abstain from tobacco products.  -  Follow up as needed for subsequent injections.  -It is possible that this patient will benefit from future lumbar facet medial branch injections for her facetogenic pain.  We will consider this if necessary.    Orestes Foy M.D.  [facility]aro Smyht, [facility], [facility]  Board Certified Pain Medicine Specialist by the American Board of Anesthesiology  Board Certified Anesthesiologist by the American Board of Anesthesiology     Much of this encounter note is an electronic transcription/translation of spoken language to printed text. The electronic translation of spoken language may permit erroneous, or at times, nonsensical words or phrases to be inadvertently transcribed. Although I have reviewed the note for such errors, some may still exist.

## 2019-09-05 NOTE — ANESTHESIA PROCEDURE NOTES
PAIN Epidural block    Pre-sedation assessment completed: 9/5/2019 1:22 PM    Patient reassessed immediately prior to procedure    Patient location during procedure: pain clinic  Start Time: 9/5/2019 1:28 PM  Stop Time: 9/5/2019 1:34 PM  Indication:procedure for pain  Performed By  Anesthesiologist: Orestes Foy MD  Preanesthetic Checklist  Completed: patient identified, site marked, surgical consent, pre-op evaluation, timeout performed, IV checked, risks and benefits discussed and monitors and equipment checked  Additional Notes  Post-Op Diagnosis Codes:     * DDD (degenerative disc disease), lumbar (M51.36)     * Lumbar spondylosis (M47.816)    The patient was observed in recovery with no evidence of neurological deficits or other problems. All questions were answered. The patient was discharged with appropriate instructions.  Prep:  Pt Position:prone  Sterile Tech:cap, gloves, mask and sterile barrier  Prep:chlorhexidine gluconate and isopropyl alcohol  Monitoring:blood pressure monitoring, continuous pulse oximetry and EKG  Procedure:Sedation: no     Approach:midline  Guidance: fluoroscopy  Location:lumbar  Level:4-5  Needle Type:Tuohy  Needle Gauge:20 G  Aspiration:negative  Medications:  Depomedrol:80 mg  Preservative Free Saline:3mL  Isovue:1mL  Comments:Appropriate epidural spread of contrast.   Post Assessment:  Post-procedure: Dressing per nursing.  Pt Tolerance:patient tolerated the procedure well with no apparent complications  Complications:no

## 2019-10-10 ENCOUNTER — CLINICAL SUPPORT NO REQUIREMENTS (OUTPATIENT)
Dept: CARDIOLOGY | Facility: CLINIC | Age: 84
End: 2019-10-10

## 2019-10-10 DIAGNOSIS — Z95.0 PACEMAKER: Primary | ICD-10-CM

## 2019-10-10 PROCEDURE — 93288 INTERROG EVL PM/LDLS PM IP: CPT | Performed by: INTERNAL MEDICINE

## 2020-01-10 ENCOUNTER — CLINICAL SUPPORT NO REQUIREMENTS (OUTPATIENT)
Dept: CARDIOLOGY | Facility: CLINIC | Age: 85
End: 2020-01-10

## 2020-01-10 DIAGNOSIS — Z95.0 PACEMAKER: Primary | ICD-10-CM

## 2020-01-10 PROCEDURE — 93294 REM INTERROG EVL PM/LDLS PM: CPT | Performed by: INTERNAL MEDICINE

## 2020-01-10 PROCEDURE — 93296 REM INTERROG EVL PM/IDS: CPT | Performed by: INTERNAL MEDICINE

## 2020-04-10 ENCOUNTER — CLINICAL SUPPORT NO REQUIREMENTS (OUTPATIENT)
Dept: CARDIOLOGY | Facility: CLINIC | Age: 85
End: 2020-04-10

## 2020-04-10 DIAGNOSIS — Z95.0 PACEMAKER: Primary | ICD-10-CM

## 2020-04-10 PROCEDURE — 93294 REM INTERROG EVL PM/LDLS PM: CPT | Performed by: INTERNAL MEDICINE

## 2020-04-10 PROCEDURE — 93296 REM INTERROG EVL PM/IDS: CPT | Performed by: INTERNAL MEDICINE

## 2020-04-29 ENCOUNTER — HOSPITAL ENCOUNTER (EMERGENCY)
Facility: HOSPITAL | Age: 85
Discharge: HOME | End: 2020-04-29
Attending: EMERGENCY MEDICINE | Admitting: EMERGENCY MEDICINE

## 2020-04-29 ENCOUNTER — HOSPITAL ENCOUNTER (OUTPATIENT)
Facility: HOSPITAL | Age: 85
Setting detail: OBSERVATION
Discharge: HOME | End: 2020-04-30
Attending: EMERGENCY MEDICINE | Admitting: EMERGENCY MEDICINE

## 2020-04-29 ENCOUNTER — APPOINTMENT (OUTPATIENT)
Dept: CT IMAGING | Facility: HOSPITAL | Age: 85
End: 2020-04-29

## 2020-04-29 VITALS
DIASTOLIC BLOOD PRESSURE: 83 MMHG | HEART RATE: 75 BPM | BODY MASS INDEX: 20.42 KG/M2 | RESPIRATION RATE: 16 BRPM | SYSTOLIC BLOOD PRESSURE: 151 MMHG | TEMPERATURE: 96.5 F | HEIGHT: 60 IN | WEIGHT: 104 LBS | OXYGEN SATURATION: 99 %

## 2020-04-29 DIAGNOSIS — S02.2XXA CLOSED FRACTURE OF NASAL BONE, INITIAL ENCOUNTER: Primary | ICD-10-CM

## 2020-04-29 DIAGNOSIS — S02.2XXA CLOSED FRACTURE OF NASAL BONE, INITIAL ENCOUNTER: ICD-10-CM

## 2020-04-29 DIAGNOSIS — S01.21XA NASAL LACERATION, INITIAL ENCOUNTER: ICD-10-CM

## 2020-04-29 DIAGNOSIS — R55 SYNCOPE AND COLLAPSE: ICD-10-CM

## 2020-04-29 DIAGNOSIS — R11.2 NON-INTRACTABLE VOMITING WITH NAUSEA, UNSPECIFIED VOMITING TYPE: ICD-10-CM

## 2020-04-29 DIAGNOSIS — S01.81XA FOREHEAD LACERATION, INITIAL ENCOUNTER: Primary | ICD-10-CM

## 2020-04-29 DIAGNOSIS — S00.83XA TRAUMATIC HEMATOMA OF FOREHEAD, INITIAL ENCOUNTER: ICD-10-CM
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
## 2020-04-29 DIAGNOSIS — R04.0 EPISTAXIS: ICD-10-CM
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
## 2020-04-29 LAB
ANION GAP SERPL CALCULATED.3IONS-SCNC: 14.9 MMOL/L (ref 5–15)
BASOPHILS # BLD AUTO: 0.07 10*3/MM3 (ref 0–0.2)
BASOPHILS NFR BLD AUTO: 0.6 % (ref 0–1.5)
BUN BLD-MCNC: 19 MG/DL (ref 8–23)
BUN/CREAT SERPL: 30.6 (ref 7–25)
CALCIUM SPEC-SCNC: 9.6 MG/DL (ref 8.6–10.5)
CHLORIDE SERPL-SCNC: 96 MMOL/L (ref 98–107)
CO2 SERPL-SCNC: 24.1 MMOL/L (ref 22–29)
CREAT BLD-MCNC: 0.62 MG/DL (ref 0.57–1)
DEPRECATED RDW RBC AUTO: 41.9 FL (ref 37–54)
EOSINOPHIL # BLD AUTO: 0.05 10*3/MM3 (ref 0–0.4)
EOSINOPHIL NFR BLD AUTO: 0.4 % (ref 0.3–6.2)
ERYTHROCYTE [DISTWIDTH] IN BLOOD BY AUTOMATED COUNT: 12.8 % (ref 12.3–15.4)
GFR SERPL CREATININE-BSD FRML MDRD: 91 ML/MIN/1.73
GLUCOSE BLD-MCNC: 173 MG/DL (ref 65–99)
HCT VFR BLD AUTO: 36.7 % (ref 34–46.6)
HGB BLD-MCNC: 12.4 G/DL (ref 12–15.9)
IMM GRANULOCYTES # BLD AUTO: 0.1 10*3/MM3 (ref 0–0.05)
IMM GRANULOCYTES NFR BLD AUTO: 0.9 % (ref 0–0.5)
LYMPHOCYTES # BLD AUTO: 1.47 10*3/MM3 (ref 0.7–3.1)
LYMPHOCYTES NFR BLD AUTO: 12.9 % (ref 19.6–45.3)
MCH RBC QN AUTO: 30.8 PG (ref 26.6–33)
MCHC RBC AUTO-ENTMCNC: 33.8 G/DL (ref 31.5–35.7)
MCV RBC AUTO: 91.1 FL (ref 79–97)
MONOCYTES # BLD AUTO: 0.73 10*3/MM3 (ref 0.1–0.9)
MONOCYTES NFR BLD AUTO: 6.4 % (ref 5–12)
NEUTROPHILS # BLD AUTO: 9.01 10*3/MM3 (ref 1.7–7)
NEUTROPHILS NFR BLD AUTO: 78.8 % (ref 42.7–76)
NRBC BLD AUTO-RTO: 0 /100 WBC (ref 0–0.2)
PLATELET # BLD AUTO: 234 10*3/MM3 (ref 140–450)
PMV BLD AUTO: 9.9 FL (ref 6–12)
POTASSIUM BLD-SCNC: 4.6 MMOL/L (ref 3.5–5.2)
RBC # BLD AUTO: 4.03 10*6/MM3 (ref 3.77–5.28)
SODIUM BLD-SCNC: 135 MMOL/L (ref 136–145)
WBC NRBC COR # BLD: 11.43 10*3/MM3 (ref 3.4–10.8)

## 2020-04-29 PROCEDURE — 90471 IMMUNIZATION ADMIN: CPT | Performed by: NURSE PRACTITIONER

## 2020-04-29 PROCEDURE — 80048 BASIC METABOLIC PNL TOTAL CA: CPT | Performed by: EMERGENCY MEDICINE

## 2020-04-29 PROCEDURE — 96374 THER/PROPH/DIAG INJ IV PUSH: CPT

## 2020-04-29 PROCEDURE — 25010000002 ONDANSETRON PER 1 MG: Performed by: EMERGENCY MEDICINE

## 2020-04-29 PROCEDURE — 70450 CT HEAD/BRAIN W/O DYE: CPT

## 2020-04-29 PROCEDURE — 99284 EMERGENCY DEPT VISIT MOD MDM: CPT

## 2020-04-29 PROCEDURE — 72125 CT NECK SPINE W/O DYE: CPT

## 2020-04-29 PROCEDURE — 93010 ELECTROCARDIOGRAM REPORT: CPT | Performed by: INTERNAL MEDICINE

## 2020-04-29 PROCEDURE — 90715 TDAP VACCINE 7 YRS/> IM: CPT | Performed by: NURSE PRACTITIONER

## 2020-04-29 PROCEDURE — 93005 ELECTROCARDIOGRAM TRACING: CPT | Performed by: EMERGENCY MEDICINE

## 2020-04-29 PROCEDURE — 25010000002 TDAP 5-2.5-18.5 LF-MCG/0.5 SUSPENSION: Performed by: NURSE PRACTITIONER

## 2020-04-29 PROCEDURE — 99285 EMERGENCY DEPT VISIT HI MDM: CPT

## 2020-04-29 PROCEDURE — 85025 COMPLETE CBC W/AUTO DIFF WBC: CPT | Performed by: EMERGENCY MEDICINE

## 2020-04-29 PROCEDURE — 70486 CT MAXILLOFACIAL W/O DYE: CPT

## 2020-04-29 RX ORDER — NEBIVOLOL 5 MG/1
2.5 TABLET ORAL ONCE
Status: COMPLETED | OUTPATIENT
Start: 2020-04-29 | End: 2020-04-29

## 2020-04-29 RX ORDER — ACETAMINOPHEN 325 MG/1
650 TABLET ORAL ONCE
Status: COMPLETED | OUTPATIENT
Start: 2020-04-29 | End: 2020-04-29

## 2020-04-29 RX ORDER — SODIUM CHLORIDE 0.9 % (FLUSH) 0.9 %
10 SYRINGE (ML) INJECTION AS NEEDED
Status: DISCONTINUED | OUTPATIENT
Start: 2020-04-29 | End: 2020-04-30 | Stop reason: HOSPADM

## 2020-04-29 RX ORDER — LIDOCAINE HYDROCHLORIDE AND EPINEPHRINE 10; 10 MG/ML; UG/ML
10 INJECTION, SOLUTION INFILTRATION; PERINEURAL ONCE
Status: DISCONTINUED | OUTPATIENT
Start: 2020-04-29 | End: 2020-04-29 | Stop reason: HOSPADM

## 2020-04-29 RX ORDER — ONDANSETRON 2 MG/ML
4 INJECTION INTRAMUSCULAR; INTRAVENOUS ONCE
Status: COMPLETED | OUTPATIENT
Start: 2020-04-29 | End: 2020-04-29

## 2020-04-29 RX ADMIN — SODIUM CHLORIDE 500 ML: 9 INJECTION, SOLUTION INTRAVENOUS at 22:59

## 2020-04-29 RX ADMIN — NEBIVOLOL HYDROCHLORIDE 2.5 MG: 5 TABLET ORAL at 18:01

## 2020-04-29 RX ADMIN — ONDANSETRON 4 MG: 2 INJECTION INTRAMUSCULAR; INTRAVENOUS at 23:02

## 2020-04-29 RX ADMIN — Medication 3 ML: at 16:47

## 2020-04-29 RX ADMIN — ACETAMINOPHEN 650 MG: 325 TABLET, FILM COATED ORAL at 17:54

## 2020-04-29 RX ADMIN — TETANUS TOXOID, REDUCED DIPHTHERIA TOXOID AND ACELLULAR PERTUSSIS VACCINE, ADSORBED 0.5 ML: 5; 2.5; 8; 8; 2.5 SUSPENSION INTRAMUSCULAR at 18:06

## 2020-04-30 ENCOUNTER — APPOINTMENT (OUTPATIENT)
Dept: GENERAL RADIOLOGY | Facility: HOSPITAL | Age: 85
End: 2020-04-30

## 2020-04-30 VITALS
SYSTOLIC BLOOD PRESSURE: 141 MMHG | RESPIRATION RATE: 18 BRPM | TEMPERATURE: 98.1 F | HEART RATE: 64 BPM | OXYGEN SATURATION: 96 % | HEIGHT: 60 IN | DIASTOLIC BLOOD PRESSURE: 56 MMHG | WEIGHT: 103.1 LBS | BODY MASS INDEX: 20.24 KG/M2

## 2020-04-30 PROBLEM — S02.2XXA CLOSED FRACTURE OF NASAL BONES: Status: ACTIVE | Noted: 2020-04-30

## 2020-04-30 PROBLEM — R55 SYNCOPE AND COLLAPSE: Status: ACTIVE | Noted: 2020-04-30

## 2020-04-30 PROBLEM — R04.0 EPISTAXIS DUE TO TRAUMA: Status: ACTIVE | Noted: 2020-04-30

## 2020-04-30 LAB
ANION GAP SERPL CALCULATED.3IONS-SCNC: 11.5 MMOL/L (ref 5–15)
BUN BLD-MCNC: 15 MG/DL (ref 8–23)
BUN/CREAT SERPL: 33.3 (ref 7–25)
CALCIUM SPEC-SCNC: 8.7 MG/DL (ref 8.6–10.5)
CHLORIDE SERPL-SCNC: 99 MMOL/L (ref 98–107)
CO2 SERPL-SCNC: 23.5 MMOL/L (ref 22–29)
CREAT BLD-MCNC: 0.45 MG/DL (ref 0.57–1)
DEPRECATED RDW RBC AUTO: 41.7 FL (ref 37–54)
ERYTHROCYTE [DISTWIDTH] IN BLOOD BY AUTOMATED COUNT: 12.7 % (ref 12.3–15.4)
GFR SERPL CREATININE-BSD FRML MDRD: 132 ML/MIN/1.73
GLUCOSE BLD-MCNC: 118 MG/DL (ref 65–99)
GLUCOSE BLDC GLUCOMTR-MCNC: 119 MG/DL (ref 70–130)
HBA1C MFR BLD: 6.5 % (ref 4.8–5.6)
HCT VFR BLD AUTO: 32.5 % (ref 34–46.6)
HGB BLD-MCNC: 11.3 G/DL (ref 12–15.9)
MCH RBC QN AUTO: 31.3 PG (ref 26.6–33)
MCHC RBC AUTO-ENTMCNC: 34.8 G/DL (ref 31.5–35.7)
MCV RBC AUTO: 90 FL (ref 79–97)
PLATELET # BLD AUTO: 222 10*3/MM3 (ref 140–450)
PMV BLD AUTO: 9.9 FL (ref 6–12)
POTASSIUM BLD-SCNC: 4 MMOL/L (ref 3.5–5.2)
RBC # BLD AUTO: 3.61 10*6/MM3 (ref 3.77–5.28)
SODIUM BLD-SCNC: 134 MMOL/L (ref 136–145)
WBC NRBC COR # BLD: 8.82 10*3/MM3 (ref 3.4–10.8)

## 2020-04-30 PROCEDURE — 80048 BASIC METABOLIC PNL TOTAL CA: CPT | Performed by: NURSE PRACTITIONER

## 2020-04-30 PROCEDURE — G0378 HOSPITAL OBSERVATION PER HR: HCPCS

## 2020-04-30 PROCEDURE — 73070 X-RAY EXAM OF ELBOW: CPT

## 2020-04-30 PROCEDURE — 82962 GLUCOSE BLOOD TEST: CPT

## 2020-04-30 PROCEDURE — 83036 HEMOGLOBIN GLYCOSYLATED A1C: CPT | Performed by: NURSE PRACTITIONER

## 2020-04-30 PROCEDURE — 36415 COLL VENOUS BLD VENIPUNCTURE: CPT | Performed by: NURSE PRACTITIONER

## 2020-04-30 PROCEDURE — 85027 COMPLETE CBC AUTOMATED: CPT | Performed by: NURSE PRACTITIONER

## 2020-04-30 RX ORDER — ACETAMINOPHEN 325 MG/1
650 TABLET ORAL EVERY 4 HOURS PRN
Status: DISCONTINUED | OUTPATIENT
Start: 2020-04-30 | End: 2020-04-30 | Stop reason: HOSPADM

## 2020-04-30 RX ORDER — BISACODYL 10 MG
10 SUPPOSITORY, RECTAL RECTAL DAILY PRN
Status: DISCONTINUED | OUTPATIENT
Start: 2020-04-30 | End: 2020-04-30 | Stop reason: HOSPADM

## 2020-04-30 RX ORDER — OXYMETAZOLINE HYDROCHLORIDE 0.05 G/100ML
2 SPRAY NASAL EVERY 6 HOURS PRN
Start: 2020-04-30 | End: 2020-05-02

## 2020-04-30 RX ORDER — ONDANSETRON 4 MG/1
4 TABLET, FILM COATED ORAL EVERY 6 HOURS PRN
Status: DISCONTINUED | OUTPATIENT
Start: 2020-04-30 | End: 2020-04-30 | Stop reason: HOSPADM

## 2020-04-30 RX ORDER — ALUMINA, MAGNESIA, AND SIMETHICONE 2400; 2400; 240 MG/30ML; MG/30ML; MG/30ML
15 SUSPENSION ORAL EVERY 6 HOURS PRN
Status: DISCONTINUED | OUTPATIENT
Start: 2020-04-30 | End: 2020-04-30 | Stop reason: HOSPADM

## 2020-04-30 RX ORDER — SODIUM CHLORIDE 0.9 % (FLUSH) 0.9 %
10 SYRINGE (ML) INJECTION EVERY 12 HOURS SCHEDULED
Status: DISCONTINUED | OUTPATIENT
Start: 2020-04-30 | End: 2020-04-30 | Stop reason: HOSPADM

## 2020-04-30 RX ORDER — SODIUM CHLORIDE 0.9 % (FLUSH) 0.9 %
10 SYRINGE (ML) INJECTION AS NEEDED
Status: DISCONTINUED | OUTPATIENT
Start: 2020-04-30 | End: 2020-04-30 | Stop reason: HOSPADM

## 2020-04-30 RX ORDER — ONDANSETRON 2 MG/ML
4 INJECTION INTRAMUSCULAR; INTRAVENOUS EVERY 6 HOURS PRN
Status: DISCONTINUED | OUTPATIENT
Start: 2020-04-30 | End: 2020-04-30 | Stop reason: HOSPADM

## 2020-04-30 RX ORDER — ACETAMINOPHEN 650 MG/1
650 SUPPOSITORY RECTAL EVERY 4 HOURS PRN
Status: DISCONTINUED | OUTPATIENT
Start: 2020-04-30 | End: 2020-04-30 | Stop reason: HOSPADM

## 2020-04-30 RX ORDER — BISACODYL 5 MG/1
5 TABLET, DELAYED RELEASE ORAL DAILY PRN
Status: DISCONTINUED | OUTPATIENT
Start: 2020-04-30 | End: 2020-04-30 | Stop reason: HOSPADM

## 2020-04-30 RX ORDER — ACETAMINOPHEN 160 MG/5ML
650 SOLUTION ORAL EVERY 4 HOURS PRN
Status: DISCONTINUED | OUTPATIENT
Start: 2020-04-30 | End: 2020-04-30 | Stop reason: HOSPADM

## 2020-04-30 RX ADMIN — SODIUM CHLORIDE, PRESERVATIVE FREE 10 ML: 5 INJECTION INTRAVENOUS at 05:38

## 2020-04-30 RX ADMIN — SODIUM CHLORIDE, PRESERVATIVE FREE 10 ML: 5 INJECTION INTRAVENOUS at 10:09

## 2020-05-06 ENCOUNTER — TELEMEDICINE (OUTPATIENT)
Dept: CARDIOLOGY | Facility: CLINIC | Age: 85
End: 2020-05-06

## 2020-05-06 ENCOUNTER — TELEMEDICINE (OUTPATIENT)
Dept: ORTHOPEDIC SURGERY | Facility: CLINIC | Age: 85
End: 2020-05-06

## 2020-05-06 VITALS
BODY MASS INDEX: 20.42 KG/M2 | DIASTOLIC BLOOD PRESSURE: 82 MMHG | HEIGHT: 60 IN | WEIGHT: 104 LBS | HEART RATE: 75 BPM | SYSTOLIC BLOOD PRESSURE: 142 MMHG

## 2020-05-06 DIAGNOSIS — S59.901A ELBOW INJURY, RIGHT, INITIAL ENCOUNTER: Primary | ICD-10-CM

## 2020-05-06 DIAGNOSIS — R53.1 WEAKNESS: ICD-10-CM

## 2020-05-06 DIAGNOSIS — R94.31 ABNORMAL ELECTROCARDIOGRAM: Primary | ICD-10-CM

## 2020-05-06 DIAGNOSIS — R00.2 PALPITATIONS: ICD-10-CM

## 2020-05-06 DIAGNOSIS — I10 ESSENTIAL HYPERTENSION: ICD-10-CM

## 2020-05-06 DIAGNOSIS — E78.49 OTHER HYPERLIPIDEMIA: ICD-10-CM

## 2020-05-06 PROBLEM — D47.2 MGUS (MONOCLONAL GAMMOPATHY OF UNKNOWN SIGNIFICANCE): Status: ACTIVE | Noted: 2017-06-13

## 2020-05-06 PROBLEM — Z95.0 PACEMAKER: Status: ACTIVE | Noted: 2017-03-08

## 2020-05-06 PROCEDURE — 99213 OFFICE O/P EST LOW 20 MIN: CPT | Performed by: INTERNAL MEDICINE

## 2020-05-06 PROCEDURE — 99214 OFFICE O/P EST MOD 30 MIN: CPT | Performed by: ORTHOPAEDIC SURGERY

## 2020-05-06 PROCEDURE — 24650 CLTX RDL HEAD/NCK FX WO MNPJ: CPT | Performed by: ORTHOPAEDIC SURGERY

## 2020-05-06 RX ORDER — TRAMADOL HYDROCHLORIDE 50 MG/1
50 TABLET ORAL EVERY 4 HOURS PRN
Qty: 60 TABLET | Refills: 0 | Status: SHIPPED | OUTPATIENT
Start: 2020-05-06 | End: 2020-12-21

## 2020-05-06 RX ORDER — DORZOLAMIDE HCL 20 MG/ML
SOLUTION/ DROPS OPHTHALMIC
COMMUNITY

## 2020-05-06 RX ORDER — ATORVASTATIN CALCIUM 20 MG/1
TABLET, FILM COATED ORAL
COMMUNITY

## 2020-05-06 NOTE — PROGRESS NOTES
History & Physical     This patient was seen via video with her permission.  Patient: Trudy Castano    YOB: 1932    Medical Record Number: [id number]    Chief Complaints: Right elbow injury    History of Present Illness: 87 y.o. female presents for evaluation of the right elbow.  The injury was sustained in a fall about 2 weeks ago.  She was seen in the emergency room and diagnosed with a radial head fracture.  Patient has been wearing a sling.  Current pain is described as moderate, constant, and aching.  Pain is worse with movement.  Rest, the sling, and pain medicine have all helped.  The patient is right hand dominant.  She typically walks with a cane and/or walker.  She does have to use her arm for ambulation.    Allergies: No Known Allergies    Home Medications:      Current Outpatient Medications:   •  atorvastatin (LIPITOR) 20 MG tablet, , Disp: , Rfl:   •  ATORVASTATIN CALCIUM PO, Take  by mouth Daily. Patient is unsure of dose , Disp: , Rfl:   •  BYSTOLIC 2.5 MG tablet, TAKE 1 TABLET DAILY, Disp: 90 tablet, Rfl: 1  •  Cholecalciferol (VITAMIN D3 PO), , Disp: , Rfl:   •  cholecalciferol (VITAMIN D3) 1000 UNITS tablet, Take 1,000 Units by mouth Daily., Disp: , Rfl:   •  dorzolamide (TRUSOPT) 2 % ophthalmic solution, , Disp: , Rfl:   •  latanoprost (XALATAN) 0.005 % ophthalmic solution, 1 drop Every Night., Disp: , Rfl:   •  metFORMIN ER (GLUCOPHAGE-XR) 500 MG 24 hr tablet, TAKE 1 TABLET BY MOUTH EVERY DAY WITH BREAKFAST FOR 1 WEEK, THEN TAKE 2 TABLETS BY MOUTH EVERY DAY, Disp: , Rfl:   •  METFORMIN HCL PO, , Disp: , Rfl:   •  montelukast (SINGULAIR) 10 MG tablet, Take 10 mg by mouth Every Night., Disp: , Rfl:   •  traMADol (ULTRAM) 50 MG tablet, Take 1 tablet by mouth Every 4 (Four) Hours As Needed for Moderate Pain ., Disp: 30 tablet, Rfl: 0    Past Medical History:   Diagnosis Date   • Arrhythmia    • Benign essential hypertension    • Broken bones    • Cancer (CMS/HCC)     skin cancer    • Chronic diastolic CHF (congestive heart failure) (CMS/[facility])    • COPD (chronic obstructive pulmonary disease) (CMS/[facility])    • DDD (degenerative disc disease), lumbar 9/5/2019   • Depression    • Fatigue    • Glaucoma    • Headache    • Hyperlipidemia    • Low back pain    • Osteoporosis    • Palpitations    • Pneumonia    • Premature atrial complex    • Premature ventricular contractions    • Shortness of breath    • Sinus bradycardia    • Spondylosis of lumbar region without myelopathy or radiculopathy 9/5/2019   • Vasovagal syncope    • Weak pulse           Past Surgical History:   Procedure Laterality Date   • BILATERAL BREAST REDUCTION     • CARDIAC CATHETERIZATION      OUTCOME: SUCCESSFUL   • CARDIAC CATHETERIZATION N/A 3/25/2016    Procedure: Left Heart Cath;  Surgeon: Phoebe Bowles MD;  Location:  [facility] CATH INVASIVE LOCATION;  Service:    • CARDIAC CATHETERIZATION N/A 3/25/2016    Procedure: Left ventriculography;  Surgeon: Phoebe Bowles MD;  Location:  [facility] CATH INVASIVE LOCATION;  Service:    • CARDIAC CATHETERIZATION N/A 3/25/2016    Procedure: Coronary angiography;  Surgeon: Phoebe Bowles MD;  Location[facility];  Service:    • CARDIAC ELECTROPHYSIOLOGY PROCEDURE N/A 2/16/2017    Procedure: Pacemaker DC new  [place];  Surgeon: Phoebe Bowles MD;  Location:  [facility] CATH INVASIVE LOCATION;  Service:    • CATARACT EXTRACTION, BILATERAL     • CERVICAL FUSION     • ECTOPIC PREGNANCY     • ELBOW DEBRIDEMENT     • EPIDURAL BLOCK     • EYE SURGERY     • HEMORRHOIDECTOMY     • HYSTERECTOMY     • INSERT / REPLACE / REMOVE PACEMAKER     • LUMBAR PUNCTURE      large volume lumbar puncture 35ml   • NECK SURGERY     • OOPHORECTOMY      FOR ECTOPIC PREGNANCY   • SPINAL FUSION  2012    C3-C4 Fusion Dr. Millard          Social History     Occupational History   • Not on file   Tobacco Use   • Smoking status: Former Smoker     Last attempt to quit: 3/1/1967     Years  since quittin.2   • Smokeless tobacco: Never Used   Substance and Sexual Activity   • Alcohol use: No     Comment: SOCIAL   • Drug use: No   • Sexual activity: Defer      Social History     Social History Narrative   • Not on file          Family History   Problem Relation Age of Onset   • Heart disease Mother        Review of Systems:      Constitutional: Denies fever, shaking or chills   Eyes: Denies change in visual acuity   HEENT: Denies nasal congestion or sore throat   Respiratory: Denies cough or shortness of breath   Cardiovascular: Denies chest pain or edema  Endocrine: Denies tremors, palpitations, intolerance of heat or cold, polyuria, polydipsia.  GI: Denies abdominal pain, nausea, vomiting, bloody stools or diarrhea  : Denies frequency, urgency, incontinence, retention, or nocturia.  Musculoskeletal: Denies numbness, tingling or loss of motor function except as above  Integument: Denies rash, lesion or ulceration   Neurologic: Denies headache or focal weakness, deficits  Heme: Denies spontaneous or excessive bleeding, epistaxis, hematuria, melena, fatigue, enlarged or tender lymph nodes.      All other pertinent positives and negatives as noted above in HPI.    Physical Exam: 87 y.o. female    There were no vitals filed for this visit.    General:  Patient is awake and alert.  Appears in no acute distress or discomfort.    Psych:  Affect and demeanor are appropriate.    Eyes:  Conjunctiva and sclera appear grossly normal.  Eyes track well and EOM seem to be intact.    Dentition:  No gross abnormalities noted.    Ears:  No gross abnormalities.  Hearing adequate for the exam.    Cardiovascular: Deferred    Lungs: Good respiratory effort and her breathing is unlabored.    Lymph: Deferred    Right upper extremity: Only limited exam could be performed.  Her skin appears benign.  She is able to extend her elbow to what appears to be about 30 degrees shy of full.  She can flex to about what appeared to  be roughly 120 degrees.  The elbow seems to move fluidly.  She is able to move her fingers and hand well.  She reports intact sensation in her hand.    Diagnostic Tests:  Lab Results   Component Value Date    GLUCOSE 118 (H) 04/30/2020    CALCIUM 8.7 04/30/2020     (L) 04/30/2020    K 4.0 04/30/2020    CO2 23.5 04/30/2020    CL 99 04/30/2020    BUN 15 04/30/2020    CREATININE 0.45 (L) 04/30/2020    EGFRIFAFRI >60 11/13/2015    EGFRIFNONA 132 04/30/2020    BCR 33.3 (H) 04/30/2020    ANIONGAP 11.5 04/30/2020     Lab Results   Component Value Date    WBC 8.82 04/30/2020    HGB 11.3 (L) 04/30/2020    HCT 32.5 (L) 04/30/2020    MCV 90.0 04/30/2020     04/30/2020     Lab Results   Component Value Date    INR 1.07 02/17/2017    INR 1.02 02/13/2017    INR 0.97 03/23/2016    PROTIME 13.5 02/17/2017    PROTIME 13.0 02/13/2017    PROTIME 12.7 03/23/2016       Imaging:  Outside AP and lateral views of the right elbow are reviewed along with the associated report.  There is a nondisplaced radial head fracture    Assessment:  Right radial head fracture    Plan:  I recommend closed treatment of this injury.  Risks were discussed including nonunion, malunion, displacement necessitating operative intervention, arthrofibrosis, and persistent pain or motion loss necessitating further intervention.  I recommend that she come out of the sling at this point and begin working on range of motion.  Appropriate activity modifications and restrictions were discussed.  She did request a pain medicine.  I have called in a prescription for tramadol for her.  I want to see her in 1 month for repeat x-rays.    Date: 5/6/2020    Coy Cooper MD

## 2020-05-06 NOTE — PROGRESS NOTES
Subjective:        Trudy Castano is a 87 y.o. female who here for follow up    CC  VIDEO CONFERENCE  FELL  HPI  87-year-old female with known history of the coronary artery disease, hypertension, hyperlipidemia palpitations here for the follow-up complaining of the shortness of breath on exertion only denies any chest pains or tightness in the chest has been wobbling occasional dizzy, blood pressure has been running normal in the 140 range     Problem List Items Addressed This Visit        Cardiovascular and Mediastinum    Abnormal electrocardiogram - Primary    Hyperlipidemia    Relevant Medications    atorvastatin (LIPITOR) 20 MG tablet    Hypertension    Palpitations        .    The following portions of the patient's history were reviewed and updated as appropriate: allergies, current medications, past family history, past medical history, past social history, past surgical history and problem list.    Past Medical History:   Diagnosis Date   • Arrhythmia    • Benign essential hypertension    • Broken bones    • Cancer (CMS/[facility])     skin cancer   • Chronic diastolic CHF (congestive heart failure) (CMS/[facility])    • COPD (chronic obstructive pulmonary disease) (CMS/[facility])    • DDD (degenerative disc disease), lumbar 9/5/2019   • Depression    • Fatigue    • Glaucoma    • Headache    • Hyperlipidemia    • Low back pain    • Osteoporosis    • Palpitations    • Pneumonia    • Premature atrial complex    • Premature ventricular contractions    • Shortness of breath    • Sinus bradycardia    • Spondylosis of lumbar region without myelopathy or radiculopathy 9/5/2019   • Vasovagal syncope    • Weak pulse      reports that she quit smoking about 53 years ago. She has never used smokeless tobacco. She reports that she does not drink alcohol or use drugs.   Family History   Problem Relation Age of Onset   • Heart disease Mother        Review of Systems  Constitutional: No wt loss, fever, fatigue  Gastrointestinal: No nausea,  "abdominal pain  Behavioral/Psych: No insomnia or anxiety   Cardiovascular no chest pains or tightness in the chest  Objective:       Physical Exam    Video conference    /82   Pulse 75   Ht 152.4 cm (60\")   Wt 47.2 kg (104 lb)   BMI 20.31 kg/m²   General appearance: No acute changes   Neck: Trachea midline; NECK, supple, no thyromegaly or lymphadenopathy   Lungs: Appears to be breathing normal   CV: No obvious abnormality  Abdomen: Not distended  Extremities: No deformity , normal color , no peripheral edema   Skin: Normal temperature, turgor and texture; no rash, ulcers          Procedures      Echocardiogram:        Current Outpatient Medications:   •  atorvastatin (LIPITOR) 20 MG tablet, , Disp: , Rfl:   •  ATORVASTATIN CALCIUM PO, Take  by mouth Daily. Patient is unsure of dose , Disp: , Rfl:   •  BYSTOLIC 2.5 MG tablet, TAKE 1 TABLET DAILY, Disp: 90 tablet, Rfl: 1  •  Cholecalciferol (VITAMIN D3 PO), , Disp: , Rfl:   •  cholecalciferol (VITAMIN D3) 1000 UNITS tablet, Take 1,000 Units by mouth Daily., Disp: , Rfl:   •  dorzolamide (TRUSOPT) 2 % ophthalmic solution, , Disp: , Rfl:   •  latanoprost (XALATAN) 0.005 % ophthalmic solution, 1 drop Every Night., Disp: , Rfl:   •  metFORMIN ER (GLUCOPHAGE-XR) 500 MG 24 hr tablet, TAKE 1 TABLET BY MOUTH EVERY DAY WITH BREAKFAST FOR 1 WEEK, THEN TAKE 2 TABLETS BY MOUTH EVERY DAY, Disp: , Rfl:   •  METFORMIN HCL PO, , Disp: , Rfl:   •  montelukast (SINGULAIR) 10 MG tablet, Take 10 mg by mouth Every Night., Disp: , Rfl:   •  traMADol (ULTRAM) 50 MG tablet, Take 1 tablet by mouth Every 4 (Four) Hours As Needed for Moderate Pain ., Disp: 30 tablet, Rfl: 0   Assessment:        Patient Active Problem List   Diagnosis   • Echocardiogram abnormal   • Abnormal electrocardiogram   • Depression   • Type 2 diabetes mellitus, without long-term current use of insulin (CMS/[facility])   • Fatigue   • Glaucoma   • Hyperlipidemia   • Hypertension   • Osteoporosis   • Palpitations   • " Premature atrial contraction   • Ventricular premature beats   • Sinus bradycardia   • Shortness of breath   • Vasovagal syncope   • Weak pulse   • Communicating hydrocephalus (CMS/HCC)   • Intermittent asthma   • Oropharyngeal dysphagia   • Precordial pain   • Abnormal cardiovascular stress test   • Chronic coronary artery disease   • Supraventricular tachycardia (CMS/HCC)   • Bradycardia   • Coronary artery disease involving native coronary artery of native heart without angina pectoris   • Pacemaker   • DDD (degenerative disc disease), lumbar   • Spondylosis of lumbar region without myelopathy or radiculopathy   • Closed fracture of nasal bones   • Epistaxis due to trauma               Plan:            ICD-10-CM ICD-9-CM   1. Abnormal electrocardiogram R94.31 794.31   2. Other hyperlipidemia E78.49 272.4   3. Essential hypertension I10 401.9   4. Palpitations R00.2 785.1     1. Abnormal electrocardiogram  No chest pain    2. Other hyperlipidemia  Continue current treatment    3. Essential hypertension  The blood pressure stable    4. Palpitations  Under control    Is 87-year-old female fell having significant imbalance, denies any chest pains or tightness in chest no shortness of breath shortness of breath on moderate exertion only we will repeat echocardiogram as well as a baseline blood test in approximately 1 month's time    Home PT has been ordered    See us  few days after the blood test and echocardiogram    This patient has consented to a telehealth visit via video. The visit was scheduled as a video visit to comply with patient safety concerns in accordance with CDC recommendations.  All vitals recorded within this visit are reported by the patient.  I spent  15 minutes in total including but not limited to the 15 minutes spent in direct conversation with this patient.     COUNSELING:    Trudy Trinh was given to patient for the following topics: diagnostic results, risk factor reductions,  impressions, risks and benefits of treatment options and importance of treatment compliance .       SMOKING COUNSELING:    [unfilled]    Dictated using Dragon dictation

## 2020-06-01 RX ORDER — MELOXICAM 7.5 MG/1
TABLET ORAL
Qty: 90 TABLET | Refills: 3 | Status: SHIPPED | OUTPATIENT
Start: 2020-06-01 | End: 2020-12-21

## 2020-06-10 ENCOUNTER — CLINICAL SUPPORT (OUTPATIENT)
Dept: CARDIOLOGY | Facility: CLINIC | Age: 85
End: 2020-06-10

## 2020-06-10 DIAGNOSIS — M47.816 SPONDYLOSIS WITHOUT MYELOPATHY OR RADICULOPATHY, LUMBAR REGION: ICD-10-CM

## 2020-06-10 DIAGNOSIS — M84.333D: ICD-10-CM

## 2020-06-10 DIAGNOSIS — M51.36 OTHER INTERVERTEBRAL DISC DEGENERATION, LUMBAR REGION: Primary | ICD-10-CM

## 2020-06-10 DIAGNOSIS — S02.2XXD FRACTURE OF NASAL BONES, SUBSEQUENT ENCOUNTER FOR FRACTURE WITH ROUTINE HEALING: ICD-10-CM

## 2020-06-10 PROCEDURE — G0180 MD CERTIFICATION HHA PATIENT: HCPCS | Performed by: INTERNAL MEDICINE

## 2020-06-16 ENCOUNTER — HOSPITAL ENCOUNTER (OUTPATIENT)
Dept: CARDIOLOGY | Facility: HOSPITAL | Age: 85
Discharge: HOME | End: 2020-06-16

## 2020-06-16 ENCOUNTER — HOSPITAL ENCOUNTER (OUTPATIENT)
Dept: CARDIOLOGY | Facility: HOSPITAL | Age: 85
Discharge: HOME | End: 2020-06-16
Admitting: INTERNAL MEDICINE

## 2020-06-16 VITALS
HEIGHT: 60 IN | HEART RATE: 75 BPM | BODY MASS INDEX: 20.42 KG/M2 | WEIGHT: 104 LBS | DIASTOLIC BLOOD PRESSURE: 78 MMHG | SYSTOLIC BLOOD PRESSURE: 168 MMHG

## 2020-06-16 LAB
AORTIC DIMENSIONLESS INDEX: 0.7 (DI)
BASOPHILS # BLD AUTO: 0.04 10*3/MM3 (ref 0–0.2)
BASOPHILS NFR BLD AUTO: 0.8 % (ref 0–1.5)
BH CV ECHO MEAS - ACS: 1.6 CM
BH CV ECHO MEAS - AO MAX PG (FULL): 3.4 MMHG
BH CV ECHO MEAS - AO MAX PG: 6.2 MMHG
BH CV ECHO MEAS - AO MEAN PG (FULL): 1.7 MMHG
BH CV ECHO MEAS - AO MEAN PG: 3.3 MMHG
BH CV ECHO MEAS - AO ROOT AREA (BSA CORRECTED): 1.9
BH CV ECHO MEAS - AO ROOT AREA: 5.6 CM^2
BH CV ECHO MEAS - AO ROOT DIAM: 2.7 CM
BH CV ECHO MEAS - AO V2 MAX: 124.9 CM/SEC
BH CV ECHO MEAS - AO V2 MEAN: 82.8 CM/SEC
BH CV ECHO MEAS - AO V2 VTI: 25.6 CM
BH CV ECHO MEAS - AVA(I,A): 2 CM^2
BH CV ECHO MEAS - AVA(I,D): 2 CM^2
BH CV ECHO MEAS - AVA(V,A): 1.9 CM^2
BH CV ECHO MEAS - AVA(V,D): 1.9 CM^2
BH CV ECHO MEAS - BSA(HAYCOCK): 1.4 M^2
BH CV ECHO MEAS - BSA: 1.4 M^2
BH CV ECHO MEAS - BZI_BMI: 20.3 KILOGRAMS/M^2
BH CV ECHO MEAS - BZI_METRIC_HEIGHT: 152.4 CM
BH CV ECHO MEAS - BZI_METRIC_WEIGHT: 47.2 KG
BH CV ECHO MEAS - EDV(CUBED): 49 ML
BH CV ECHO MEAS - EDV(MOD-SP2): 41 ML
BH CV ECHO MEAS - EDV(MOD-SP4): 39 ML
BH CV ECHO MEAS - EDV(TEICH): 56.6 ML
BH CV ECHO MEAS - EF(CUBED): 63.1 %
BH CV ECHO MEAS - EF(MOD-BP): 61.1 %
BH CV ECHO MEAS - EF(MOD-SP2): 63.4 %
BH CV ECHO MEAS - EF(MOD-SP4): 64.1 %
BH CV ECHO MEAS - EF(TEICH): 55.5 %
BH CV ECHO MEAS - ESV(CUBED): 18.1 ML
BH CV ECHO MEAS - ESV(MOD-SP2): 15 ML
BH CV ECHO MEAS - ESV(MOD-SP4): 14 ML
BH CV ECHO MEAS - ESV(TEICH): 25.2 ML
BH CV ECHO MEAS - FS: 28.2 %
BH CV ECHO MEAS - IVS/LVPW: 1.1
BH CV ECHO MEAS - IVSD: 1 CM
BH CV ECHO MEAS - LAT PEAK E' VEL: 9.9 CM/SEC
BH CV ECHO MEAS - LV DIASTOLIC VOL/BSA (35-75): 27.6 ML/M^2
BH CV ECHO MEAS - LV MASS(C)D: 102.9 GRAMS
BH CV ECHO MEAS - LV MASS(C)DI: 72.8 GRAMS/M^2
BH CV ECHO MEAS - LV MAX PG: 2.8 MMHG
BH CV ECHO MEAS - LV MEAN PG: 1.6 MMHG
BH CV ECHO MEAS - LV SYSTOLIC VOL/BSA (12-30): 9.9 ML/M^2
BH CV ECHO MEAS - LV V1 MAX: 83.9 CM/SEC
BH CV ECHO MEAS - LV V1 MEAN: 58.5 CM/SEC
BH CV ECHO MEAS - LV V1 VTI: 18.7 CM
BH CV ECHO MEAS - LVIDD: 3.7 CM
BH CV ECHO MEAS - LVIDS: 2.6 CM
BH CV ECHO MEAS - LVLD AP2: 5.8 CM
BH CV ECHO MEAS - LVLD AP4: 5.7 CM
BH CV ECHO MEAS - LVLS AP2: 5.2 CM
BH CV ECHO MEAS - LVLS AP4: 4.7 CM
BH CV ECHO MEAS - LVOT AREA (M): 2.8 CM^2
BH CV ECHO MEAS - LVOT AREA: 2.8 CM^2
BH CV ECHO MEAS - LVOT DIAM: 1.9 CM
BH CV ECHO MEAS - LVPWD: 0.9 CM
BH CV ECHO MEAS - MED PEAK E' VEL: 5.5 CM/SEC
BH CV ECHO MEAS - MR MAX PG: 48.3 MMHG
BH CV ECHO MEAS - MR MAX VEL: 347.5 CM/SEC
BH CV ECHO MEAS - MV A DUR: 0.11 SEC
BH CV ECHO MEAS - MV A MAX VEL: 83.4 CM/SEC
BH CV ECHO MEAS - MV DEC SLOPE: 410.2 CM/SEC^2
BH CV ECHO MEAS - MV DEC TIME: 193 SEC
BH CV ECHO MEAS - MV E MAX VEL: 76.6 CM/SEC
BH CV ECHO MEAS - MV E/A: 0.92
BH CV ECHO MEAS - MV MAX PG: 3.7 MMHG
BH CV ECHO MEAS - MV MEAN PG: 2 MMHG
BH CV ECHO MEAS - MV P1/2T MAX VEL: 102.5 CM/SEC
BH CV ECHO MEAS - MV P1/2T: 73.2 MSEC
BH CV ECHO MEAS - MV V2 MAX: 96.4 CM/SEC
BH CV ECHO MEAS - MV V2 MEAN: 67 CM/SEC
BH CV ECHO MEAS - MV V2 VTI: 24.4 CM
BH CV ECHO MEAS - MVA P1/2T LCG: 2.1 CM^2
BH CV ECHO MEAS - MVA(P1/2T): 3 CM^2
BH CV ECHO MEAS - MVA(VTI): 2.1 CM^2
BH CV ECHO MEAS - PA ACC TIME: 0.07 SEC
BH CV ECHO MEAS - PA MAX PG (FULL): 1.9 MMHG
BH CV ECHO MEAS - PA MAX PG: 2.8 MMHG
BH CV ECHO MEAS - PA PR(ACCEL): 48.1 MMHG
BH CV ECHO MEAS - PA V2 MAX: 83.4 CM/SEC
BH CV ECHO MEAS - PULM A REVS DUR: 0.11 SEC
BH CV ECHO MEAS - PULM A REVS VEL: 39.3 CM/SEC
BH CV ECHO MEAS - PULM DIAS VEL: 37.8 CM/SEC
BH CV ECHO MEAS - PULM S/D: 1.7
BH CV ECHO MEAS - PULM SYS VEL: 65.5 CM/SEC
BH CV ECHO MEAS - RAP SYSTOLE: 8 MMHG
BH CV ECHO MEAS - RV BASE (<4.1) - OBSOLETE: 3.1 CM
BH CV ECHO MEAS - RV MAX PG: 0.9 MMHG
BH CV ECHO MEAS - RV MEAN PG: 0.47 MMHG
BH CV ECHO MEAS - RV V1 MAX: 47.5 CM/SEC
BH CV ECHO MEAS - RV V1 MEAN: 31.9 CM/SEC
BH CV ECHO MEAS - RV V1 VTI: 9.3 CM
BH CV ECHO MEAS - RVSP: 30 MMHG
BH CV ECHO MEAS - SI(AO): 100.7 ML/M^2
BH CV ECHO MEAS - SI(CUBED): 21.9 ML/M^2
BH CV ECHO MEAS - SI(LVOT): 36.6 ML/M^2
BH CV ECHO MEAS - SI(MOD-SP2): 18.4 ML/M^2
BH CV ECHO MEAS - SI(MOD-SP4): 17.7 ML/M^2
BH CV ECHO MEAS - SI(TEICH): 22.2 ML/M^2
BH CV ECHO MEAS - SV(AO): 142.3 ML
BH CV ECHO MEAS - SV(CUBED): 30.9 ML
BH CV ECHO MEAS - SV(LVOT): 51.8 ML
BH CV ECHO MEAS - SV(MOD-SP2): 26 ML
BH CV ECHO MEAS - SV(MOD-SP4): 25 ML
BH CV ECHO MEAS - SV(TEICH): 31.4 ML
BH CV ECHO MEAS - TAPSE (>1.6): 1.9 CM
BH CV ECHO MEAS - TR MAX PG: 22 MMHG
BH CV ECHO MEAS - TR MAX VEL: 236.7 CM/SEC
BH CV ECHO MEASUREMENTS AVERAGE E/E' RATIO: 9.95
BH CV XLRA - RV BASE: 3.1 CM
BH CV XLRA - TDI S': 15.1 CM/SEC
DEPRECATED RDW RBC AUTO: 43.4 FL (ref 37–54)
EOSINOPHIL # BLD AUTO: 0.16 10*3/MM3 (ref 0–0.4)
EOSINOPHIL NFR BLD AUTO: 3.1 % (ref 0.3–6.2)
ERYTHROCYTE [DISTWIDTH] IN BLOOD BY AUTOMATED COUNT: 13.1 % (ref 12.3–15.4)
HCT VFR BLD AUTO: 36.4 % (ref 34–46.6)
HGB BLD-MCNC: 12.3 G/DL (ref 12–15.9)
IMM GRANULOCYTES # BLD AUTO: 0.02 10*3/MM3 (ref 0–0.05)
IMM GRANULOCYTES NFR BLD AUTO: 0.4 % (ref 0–0.5)
LEFT ATRIUM VOLUME INDEX: 19 ML/M2
LYMPHOCYTES # BLD AUTO: 1.02 10*3/MM3 (ref 0.7–3.1)
LYMPHOCYTES NFR BLD AUTO: 20 % (ref 19.6–45.3)
MAGNESIUM SERPL-MCNC: 1.9 MG/DL (ref 1.6–2.4)
MAXIMAL PREDICTED HEART RATE: 133 BPM
MCH RBC QN AUTO: 31 PG (ref 26.6–33)
MCHC RBC AUTO-ENTMCNC: 33.8 G/DL (ref 31.5–35.7)
MCV RBC AUTO: 91.7 FL (ref 79–97)
MONOCYTES # BLD AUTO: 0.39 10*3/MM3 (ref 0.1–0.9)
MONOCYTES NFR BLD AUTO: 7.6 % (ref 5–12)
NEUTROPHILS # BLD AUTO: 3.47 10*3/MM3 (ref 1.7–7)
NEUTROPHILS NFR BLD AUTO: 68.1 % (ref 42.7–76)
NRBC BLD AUTO-RTO: 0 /100 WBC (ref 0–0.2)
PLATELET # BLD AUTO: 254 10*3/MM3 (ref 140–450)
PMV BLD AUTO: 10.5 FL (ref 6–12)
RBC # BLD AUTO: 3.97 10*6/MM3 (ref 3.77–5.28)
STRESS TARGET HR: 113 BPM
T-UPTAKE NFR SERPL: 1.1 TBI (ref 0.8–1.3)
T4 SERPL-MCNC: 6.18 MCG/DL (ref 4.5–11.7)
TSH SERPL DL<=0.05 MIU/L-ACNC: 2.65 UIU/ML (ref 0.27–4.2)
WBC NRBC COR # BLD: 5.1 10*3/MM3 (ref 3.4–10.8)

## 2020-06-16 PROCEDURE — 93306 TTE W/DOPPLER COMPLETE: CPT

## 2020-06-16 PROCEDURE — 84436 ASSAY OF TOTAL THYROXINE: CPT | Performed by: INTERNAL MEDICINE

## 2020-06-16 PROCEDURE — 84443 ASSAY THYROID STIM HORMONE: CPT | Performed by: INTERNAL MEDICINE

## 2020-06-16 PROCEDURE — 93306 TTE W/DOPPLER COMPLETE: CPT | Performed by: INTERNAL MEDICINE

## 2020-06-16 PROCEDURE — 84479 ASSAY OF THYROID (T3 OR T4): CPT | Performed by: INTERNAL MEDICINE

## 2020-06-16 PROCEDURE — 36415 COLL VENOUS BLD VENIPUNCTURE: CPT | Performed by: INTERNAL MEDICINE

## 2020-06-16 PROCEDURE — 85025 COMPLETE CBC W/AUTO DIFF WBC: CPT | Performed by: INTERNAL MEDICINE

## 2020-06-16 PROCEDURE — 83735 ASSAY OF MAGNESIUM: CPT | Performed by: INTERNAL MEDICINE

## 2020-06-22 ENCOUNTER — OFFICE VISIT (OUTPATIENT)
Dept: CARDIOLOGY | Facility: CLINIC | Age: 85
End: 2020-06-22

## 2020-06-22 VITALS
BODY MASS INDEX: 20.22 KG/M2 | SYSTOLIC BLOOD PRESSURE: 167 MMHG | HEIGHT: 60 IN | HEART RATE: 74 BPM | DIASTOLIC BLOOD PRESSURE: 80 MMHG | WEIGHT: 103 LBS

## 2020-06-22 DIAGNOSIS — I47.1 SUPRAVENTRICULAR TACHYCARDIA (HCC): ICD-10-CM

## 2020-06-22 DIAGNOSIS — I49.3 VENTRICULAR PREMATURE BEATS: ICD-10-CM

## 2020-06-22 DIAGNOSIS — R00.2 PALPITATIONS: ICD-10-CM

## 2020-06-22 DIAGNOSIS — Z95.0 PACEMAKER: ICD-10-CM

## 2020-06-22 DIAGNOSIS — I25.10 CORONARY ARTERY DISEASE INVOLVING NATIVE CORONARY ARTERY OF NATIVE HEART WITHOUT ANGINA PECTORIS: Primary | ICD-10-CM

## 2020-06-22 DIAGNOSIS — I10 ESSENTIAL HYPERTENSION: ICD-10-CM

## 2020-06-22 PROCEDURE — 99213 OFFICE O/P EST LOW 20 MIN: CPT | Performed by: INTERNAL MEDICINE

## 2020-06-22 RX ORDER — NEBIVOLOL 5 MG/1
2.5 TABLET ORAL DAILY
Qty: 30 TABLET | Refills: 6 | Status: SHIPPED | OUTPATIENT
Start: 2020-06-22 | End: 2020-09-22 | Stop reason: SDUPTHER

## 2020-06-22 RX ORDER — ASPIRIN 81 MG/1
TABLET ORAL
COMMUNITY

## 2020-06-22 RX ORDER — DORZOLAMIDE HYDROCHLORIDE AND TIMOLOL MALEATE 20; 5 MG/ML; MG/ML
SOLUTION/ DROPS OPHTHALMIC
COMMUNITY
Start: 2020-06-04 | End: 2020-06-22 | Stop reason: SDUPTHER

## 2020-07-17 ENCOUNTER — CLINICAL SUPPORT NO REQUIREMENTS (OUTPATIENT)
Dept: CARDIOLOGY | Facility: CLINIC | Age: 85
End: 2020-07-17

## 2020-07-17 DIAGNOSIS — Z95.0 PACEMAKER: Primary | ICD-10-CM

## 2020-07-17 PROCEDURE — 93296 REM INTERROG EVL PM/IDS: CPT | Performed by: INTERNAL MEDICINE

## 2020-07-17 PROCEDURE — 93294 REM INTERROG EVL PM/LDLS PM: CPT | Performed by: INTERNAL MEDICINE

## 2020-09-22 RX ORDER — NEBIVOLOL 5 MG/1
5 TABLET ORAL DAILY
Qty: 90 TABLET | Refills: 1 | Status: SHIPPED | OUTPATIENT
Start: 2020-09-22

## 2020-10-08 ENCOUNTER — CLINICAL SUPPORT NO REQUIREMENTS (OUTPATIENT)
Dept: CARDIOLOGY | Facility: CLINIC | Age: 85
End: 2020-10-08

## 2020-10-21 ENCOUNTER — TELEPHONE (OUTPATIENT)
Dept: CARDIOLOGY | Facility: CLINIC | Age: 85
End: 2020-10-21

## 2020-10-21 NOTE — TELEPHONE ENCOUNTER
DAUGHTER CALLED STATING THAT HER MOTHER HAS BEEN GETTING DIZZY SINCE THE INCREASE OF BYSTOLIC TO 5 MG  SAID SHE WAS DOING GOOD ON THE 2.5     PLEASE ADVISE  TRACY [phone]

## 2020-10-22 NOTE — TELEPHONE ENCOUNTER
Per Dr STANFORD pt to continue the 5 mg due to bp will increase on lower dose.     Arpita verbalized understanding and also called pcp to discuss dizziness.

## 2020-10-22 NOTE — TELEPHONE ENCOUNTER
S/w pt daughter (bairon) she states pt bp last night before meds was 147/80  73  Few hours after 151/73  83   Would like to know if pt needs different bp med

## 2020-12-21 ENCOUNTER — OFFICE VISIT (OUTPATIENT)
Dept: CARDIOLOGY | Facility: CLINIC | Age: 85
End: 2020-12-21

## 2020-12-21 VITALS
DIASTOLIC BLOOD PRESSURE: 87 MMHG | BODY MASS INDEX: 20.12 KG/M2 | HEART RATE: 83 BPM | SYSTOLIC BLOOD PRESSURE: 158 MMHG | HEIGHT: 60 IN

## 2020-12-21 DIAGNOSIS — I25.10 CORONARY ARTERY DISEASE INVOLVING NATIVE CORONARY ARTERY OF NATIVE HEART WITHOUT ANGINA PECTORIS: ICD-10-CM

## 2020-12-21 DIAGNOSIS — I10 ESSENTIAL HYPERTENSION: ICD-10-CM

## 2020-12-21 DIAGNOSIS — R00.2 PALPITATIONS: ICD-10-CM

## 2020-12-21 DIAGNOSIS — E78.49 OTHER HYPERLIPIDEMIA: ICD-10-CM

## 2020-12-21 DIAGNOSIS — Z95.0 PACEMAKER: Primary | ICD-10-CM

## 2020-12-21 PROCEDURE — 99213 OFFICE O/P EST LOW 20 MIN: CPT | Performed by: INTERNAL MEDICINE

## 2020-12-21 NOTE — PROGRESS NOTES
6 month follow up    Subjective:        Trudy Castano is a 88 y.o. female who here for follow up    CC  BAD SMELLING URINE  HPI  88-year-old female with known history of coronary artery disease, benign essential arterial hypertension and hyperlipidemia here for the follow-up and complaining of the bad smelling urine denies any chest pains or tightness in the chest     Problems Addressed this Visit        Cardiovascular and Mediastinum    Hyperlipidemia    Hypertension    Palpitations    Coronary artery disease involving native coronary artery of native heart without angina pectoris    Pacemaker - Primary      Diagnoses       Codes Comments    Pacemaker    -  Primary ICD-10-CM: Z95.0  ICD-9-CM: V45.01     Palpitations     ICD-10-CM: R00.2  ICD-9-CM: 785.1     Essential hypertension     ICD-10-CM: I10  ICD-9-CM: 401.9     Other hyperlipidemia     ICD-10-CM: E78.49  ICD-9-CM: 272.4     Coronary artery disease involving native coronary artery of native heart without angina pectoris     ICD-10-CM: I25.10  ICD-9-CM: 414.01         .    The following portions of the patient's history were reviewed and updated as appropriate: allergies, current medications, past family history, past medical history, past social history, past surgical history and problem list.    Past Medical History:   Diagnosis Date   • Arrhythmia    • Benign essential hypertension    • Broken bones    • Cancer (CMS/[facility])     skin cancer   • Chronic diastolic CHF (congestive heart failure) (CMS/HCC)    • COPD (chronic obstructive pulmonary disease) (CMS/HCC)    • DDD (degenerative disc disease), lumbar 9/5/2019   • Depression    • Fatigue    • Glaucoma    • Headache    • Hyperlipidemia    • Low back pain    • Osteoporosis    • Palpitations    • Pneumonia    • Premature atrial complex    • Premature ventricular contractions    • Shortness of breath    • Sinus bradycardia    • Spondylosis of lumbar region without myelopathy or radiculopathy 9/5/2019   •  "Vasovagal syncope    • Weak pulse      reports that she quit smoking about 53 years ago. She has never used smokeless tobacco. She reports that she does not drink alcohol or use drugs.   Family History   Problem Relation Age of Onset   • Heart disease Mother        Review of Systems  Constitutional: No wt loss, fever, fatigue  Gastrointestinal: No nausea, abdominal pain  Behavioral/Psych: No insomnia or anxiety   Cardiovascular no chest pains or tightness in the chest  Objective:       Physical Exam  /87 (BP Location: Right arm)   Pulse 83   Ht 152.4 cm (60\")   BMI 20.12 kg/m²   General appearance: No acute changes   Neck: Trachea midline; NECK, supple, no thyromegaly or lymphadenopathy   Lungs: Normal size and shape, normal breath sounds, equal distribution of air, no rales and rhonchi   CV: S1-S2 regular, no murmurs, no rub, no gallop   Abdomen: Soft, non-tender; no masses , no abnormal abdominal sounds   Extremities: No deformity , normal color , no peripheral edema   Skin: Normal temperature, turgor and texture; no rash, ulcers          Procedures      Echocardiogram:        Current Outpatient Medications:   •  aspirin (Matty Aspirin EC Low Dose) 81 MG EC tablet, , Disp: , Rfl:   •  atorvastatin (LIPITOR) 20 MG tablet, , Disp: , Rfl:   •  Cholecalciferol (VITAMIN D3 PO), , Disp: , Rfl:   •  dorzolamide (TRUSOPT) 2 % ophthalmic solution, , Disp: , Rfl:   •  latanoprost (XALATAN) 0.005 % ophthalmic solution, 1 drop Every Night., Disp: , Rfl:   •  METFORMIN HCL PO, , Disp: , Rfl:   •  nebivolol (BYSTOLIC) 5 MG tablet, Take 1 tablet by mouth Daily., Disp: 90 tablet, Rfl: 1   Assessment:        Patient Active Problem List   Diagnosis   • Echocardiogram abnormal   • Abnormal electrocardiogram   • Depression   • Diabetes mellitus type 2 in nonobese (CMS/HCC)   • Fatigue   • Glaucoma   • Hyperlipidemia   • Hypertension   • Osteoporosis   • Palpitations   • Premature atrial contraction   • Ventricular premature " beats   • Sinus bradycardia   • Shortness of breath   • Vasovagal syncope   • Weak pulse   • Communicating hydrocephalus (CMS/HCC)   • Intermittent asthma   • Oropharyngeal dysphagia   • Precordial pain   • Abnormal cardiovascular stress test   • Chronic coronary artery disease   • Supraventricular tachycardia (CMS/HCC)   • Bradycardia   • Coronary artery disease involving native coronary artery of native heart without angina pectoris   • Pacemaker   • DDD (degenerative disc disease), lumbar   • Spondylosis of lumbar region without myelopathy or radiculopathy   • Closed fracture of nasal bones   • Epistaxis due to trauma   • MGUS (monoclonal gammopathy of unknown significance)   • Chronic diastolic congestive heart failure (CMS/HCC)               Plan:            ICD-10-CM ICD-9-CM   1. Pacemaker  Z95.0 V45.01   2. Palpitations  R00.2 785.1   3. Essential hypertension  I10 401.9   4. Other hyperlipidemia  E78.49 272.4   5. Coronary artery disease involving native coronary artery of native heart without angina pectoris  I25.10 414.01     1. Pacemaker  Pacemaker functioning normally    2. Palpitations  Under control    3. Essential hypertension  Patient will be keeping an eye on the blood pressure at home    4. Other hyperlipidemia  Continue current treatment    5. Coronary artery disease involving native coronary artery of native heart without angina pectoris  No angina pectoris      KEEP AN EYE ON BP AT HOME    UA    SEE IN 6 MONTH  COUNSELING:    Trudy Trinh was given to patient for the following topics: diagnostic results, risk factor reductions, impressions, risks and benefits of treatment options and importance of treatment compliance .       SMOKING COUNSELING:    [unfilled]    Dictated using Dragon dictation

## 2021-01-01 ENCOUNTER — OFFICE VISIT (OUTPATIENT)
Dept: CARDIOLOGY | Facility: CLINIC | Age: 86
End: 2021-01-01

## 2021-01-01 ENCOUNTER — IMMUNIZATION (OUTPATIENT)
Dept: VACCINE CLINIC | Facility: HOSPITAL | Age: 86
End: 2021-01-01

## 2021-01-01 ENCOUNTER — CLINICAL SUPPORT NO REQUIREMENTS (OUTPATIENT)
Dept: CARDIOLOGY | Facility: CLINIC | Age: 86
End: 2021-01-01

## 2021-01-01 VITALS
HEART RATE: 77 BPM | WEIGHT: 104 LBS | BODY MASS INDEX: 20.42 KG/M2 | HEIGHT: 60 IN | SYSTOLIC BLOOD PRESSURE: 160 MMHG | DIASTOLIC BLOOD PRESSURE: 89 MMHG

## 2021-01-01 DIAGNOSIS — Z95.0 PACEMAKER: ICD-10-CM

## 2021-01-01 DIAGNOSIS — E78.49 OTHER HYPERLIPIDEMIA: ICD-10-CM

## 2021-01-01 DIAGNOSIS — I25.10 CHRONIC CORONARY ARTERY DISEASE: Primary | ICD-10-CM

## 2021-01-01 DIAGNOSIS — I10 ESSENTIAL HYPERTENSION: ICD-10-CM

## 2021-01-01 DIAGNOSIS — Z23 IMMUNIZATION DUE: ICD-10-CM

## 2021-01-01 DIAGNOSIS — I25.10 CORONARY ARTERY DISEASE INVOLVING NATIVE CORONARY ARTERY OF NATIVE HEART WITHOUT ANGINA PECTORIS: ICD-10-CM

## 2021-01-01 DIAGNOSIS — Z95.0 PACEMAKER: Primary | ICD-10-CM

## 2021-01-01 PROCEDURE — 99213 OFFICE O/P EST LOW 20 MIN: CPT | Performed by: INTERNAL MEDICINE

## 2021-01-01 PROCEDURE — 0004A ADM SARSCOV2 30MCG/0.3ML BOOSTER: CPT | Performed by: INTERNAL MEDICINE

## 2021-01-01 PROCEDURE — 93296 REM INTERROG EVL PM/IDS: CPT | Performed by: INTERNAL MEDICINE

## 2021-01-01 PROCEDURE — 93288 INTERROG EVL PM/LDLS PM IP: CPT | Performed by: INTERNAL MEDICINE

## 2021-01-01 PROCEDURE — 93000 ELECTROCARDIOGRAM COMPLETE: CPT | Performed by: INTERNAL MEDICINE

## 2021-01-01 PROCEDURE — 91300 HC SARSCOV02 VAC 30MCG/0.3ML IM: CPT | Performed by: INTERNAL MEDICINE

## 2021-01-01 PROCEDURE — 93294 REM INTERROG EVL PM/LDLS PM: CPT | Performed by: INTERNAL MEDICINE

## 2021-01-01 RX ORDER — MONTELUKAST SODIUM 10 MG/1
TABLET ORAL
COMMUNITY
Start: 2021-01-01

## 2021-06-21 NOTE — PROGRESS NOTES
6 MO FOLLOW UP   Subjective:        Trudy Castano is a 88 y.o. female who here for follow up    CC  6 MPONTHS FOLLOW UP  HPI  88-year-old female known history of the coronary artery disease, hyperlipidemia as well as a pacemaker along with a history of the hypertension, here for the follow-up complaining of the shortness of breath on exertion no different than before     Problems Addressed this Visit        Cardiac and Vasculature    Hyperlipidemia    Relevant Orders    Adult Transthoracic Echo Complete W/ Cont if Necessary Per Protocol    Hypertension    Chronic coronary artery disease - Primary    Relevant Orders    Adult Transthoracic Echo Complete W/ Cont if Necessary Per Protocol    Coronary artery disease involving native coronary artery of native heart without angina pectoris    Pacemaker    Relevant Orders    Adult Transthoracic Echo Complete W/ Cont if Necessary Per Protocol      Diagnoses       Codes Comments    Chronic coronary artery disease    -  Primary ICD-10-CM: I25.10  ICD-9-CM: 414.00     Other hyperlipidemia     ICD-10-CM: E78.49  ICD-9-CM: 272.4     Pacemaker     ICD-10-CM: Z95.0  ICD-9-CM: V45.01     Coronary artery disease involving native coronary artery of native heart without angina pectoris     ICD-10-CM: I25.10  ICD-9-CM: 414.01     Essential hypertension     ICD-10-CM: I10  ICD-9-CM: 401.9         .    The following portions of the patient's history were reviewed and updated as appropriate: allergies, current medications, past family history, past medical history, past social history, past surgical history and problem list.    Past Medical History:   Diagnosis Date   • Arrhythmia    • Benign essential hypertension    • Broken bones    • Cancer (CMS/HCC)     skin cancer   • Chronic diastolic CHF (congestive heart failure) (CMS/HCC)    • COPD (chronic obstructive pulmonary disease) (CMS/HCC)    • DDD (degenerative disc disease), lumbar 9/5/2019   • Depression    • Fatigue    • Glaucoma   "  • Headache    • Hyperlipidemia    • Low back pain    • Osteoporosis    • Palpitations    • Pneumonia    • Premature atrial complex    • Premature ventricular contractions    • Shortness of breath    • Sinus bradycardia    • Spondylosis of lumbar region without myelopathy or radiculopathy 9/5/2019   • Vasovagal syncope    • Weak pulse      reports that she quit smoking about 54 years ago. She has never used smokeless tobacco. She reports that she does not drink alcohol and does not use drugs.   Family History   Problem Relation Age of Onset   • Heart disease Mother        Review of Systems  Constitutional: No wt loss, fever, fatigue  Gastrointestinal: No nausea, abdominal pain  Behavioral/Psych: No insomnia or anxiety   Cardiovascular shortness of breath on exertion  Objective:       Physical Exam  /89   Pulse 77   Ht 152.4 cm (60\")   Wt 47.2 kg (104 lb)   BMI 20.31 kg/m²   General appearance: No acute changes   Neck: Trachea midline; NECK, supple, no thyromegaly or lymphadenopathy   Lungs: Normal size and shape, normal breath sounds, equal distribution of air, no rales and rhonchi   CV: S1-S2 regular, no murmurs, no rub, no gallop   Abdomen: Soft, non-tender; no masses , no abnormal abdominal sounds   Extremities: No deformity , normal color , no peripheral edema   Skin: Normal temperature, turgor and texture; no rash, ulcers            ECG 12 Lead    Date/Time: 6/21/2021 10:41 AM  Performed by: Phoebe Bowles MD  Authorized by: Phoebe Bowles MD   Comparison: compared with previous ECG   Similar to previous ECG  Rhythm: sinus rhythm  ST Flattening: all    Clinical impression: non-specific ECG              Echocardiogram:        Current Outpatient Medications:   •  aspirin (Matty Aspirin EC Low Dose) 81 MG EC tablet, , Disp: , Rfl:   •  atorvastatin (LIPITOR) 20 MG tablet, , Disp: , Rfl:   •  Cholecalciferol (VITAMIN D3 PO), , Disp: , Rfl:   •  dorzolamide (TRUSOPT) 2 % ophthalmic " solution, , Disp: , Rfl:   •  latanoprost (XALATAN) 0.005 % ophthalmic solution, 1 drop Every Night., Disp: , Rfl:   •  METFORMIN HCL PO, , Disp: , Rfl:   •  montelukast (SINGULAIR) 10 MG tablet, , Disp: , Rfl:   •  nebivolol (BYSTOLIC) 5 MG tablet, Take 1 tablet by mouth Daily., Disp: 90 tablet, Rfl: 1   Assessment:        Patient Active Problem List   Diagnosis   • Echocardiogram abnormal   • Abnormal electrocardiogram   • Depression   • Diabetes mellitus type 2 in nonobese (CMS/HCC)   • Fatigue   • Glaucoma   • Hyperlipidemia   • Hypertension   • Osteoporosis   • Palpitations   • Premature atrial contraction   • Ventricular premature beats   • Sinus bradycardia   • Shortness of breath   • Vasovagal syncope   • Weak pulse   • Communicating hydrocephalus (CMS/HCC)   • Intermittent asthma   • Oropharyngeal dysphagia   • Precordial pain   • Abnormal cardiovascular stress test   • Chronic coronary artery disease   • Supraventricular tachycardia (CMS/HCC)   • Bradycardia   • Coronary artery disease involving native coronary artery of native heart without angina pectoris   • Pacemaker   • DDD (degenerative disc disease), lumbar   • Spondylosis of lumbar region without myelopathy or radiculopathy   • Closed fracture of nasal bones   • Epistaxis due to trauma   • MGUS (monoclonal gammopathy of unknown significance)   • Chronic diastolic congestive heart failure (CMS/HCC)               Plan:            ICD-10-CM ICD-9-CM   1. Chronic coronary artery disease  I25.10 414.00   2. Other hyperlipidemia  E78.49 272.4   3. Pacemaker  Z95.0 V45.01     1. Chronic coronary artery disease  No angina pectoris    2. Other hyperlipidemia  Continue current treatment    3. Pacemaker  Pacemaker functioning normally    SEE IN 1 YR  ECHO  COUNSELING:    Trudy Trinh was given to patient for the following topics: diagnostic results, risk factor reductions, impressions, risks and benefits of treatment options and importance of  treatment compliance .       SMOKING COUNSELING:    [unfilled]    Dictated using Dragon dictation

## 2022-01-01 ENCOUNTER — TELEPHONE (OUTPATIENT)
Dept: CARDIOLOGY | Facility: CLINIC | Age: 87
End: 2022-01-01

## 2022-02-11 NOTE — TELEPHONE ENCOUNTER
PTS DAUGHTER CALLED, PT HAS A BRAIN BLEED AND HAS BEEN MOVED TO HOSPICE CARE.  DAUGHTER CALLED TO SEE IF ANYTHING NEEDED TO BE DONE WITH PTS PACEMAKER.  I S/W SANDIE, [facility] REP.  THERE ISN'T ANYTHING TO BE DONE TO THE PACEMAKER.   PTS DAUGHTER IS AWARE AND VERBALIZED UNDERSTANDING.

## (undated) DEVICE — LIMB HOLDER, WRIST/ANKLE: Brand: DEROYAL

## (undated) DEVICE — LOU PACE DEFIB: Brand: MEDLINE INDUSTRIES, INC.

## (undated) DEVICE — SOL IRR NACL 0.9PCT BT 1000ML

## (undated) DEVICE — Device

## (undated) DEVICE — AIRLIFE™ NASAL OXYGEN CANNULA CURVED, NONFLARED TIP WITH 21 FOOT (6.4 M) CRUSH-RESISTANT TUBING, OVER-THE-EAR STYLE: Brand: AIRLIFE™